# Patient Record
Sex: FEMALE | Race: WHITE | Employment: FULL TIME | URBAN - METROPOLITAN AREA
[De-identification: names, ages, dates, MRNs, and addresses within clinical notes are randomized per-mention and may not be internally consistent; named-entity substitution may affect disease eponyms.]

---

## 2017-01-06 ENCOUNTER — APPOINTMENT (OUTPATIENT)
Dept: LAB | Age: 32
End: 2017-01-06
Attending: OBSTETRICS & GYNECOLOGY
Payer: COMMERCIAL

## 2017-01-06 DIAGNOSIS — Z34.93 ENCOUNTER FOR SUPERVISION OF NORMAL PREGNANCY IN THIRD TRIMESTER, UNSPECIFIED GRAVIDITY: ICD-10-CM

## 2017-01-06 LAB
ERYTHROCYTE [DISTWIDTH] IN BLOOD BY AUTOMATED COUNT: 13.1 % (ref 11–15)
HCT VFR BLD AUTO: 39.5 % (ref 35–48)
HGB BLD-MCNC: 13.5 G/DL (ref 12–16)
MCH RBC QN AUTO: 32.5 PG (ref 27–32)
MCHC RBC AUTO-ENTMCNC: 34.2 G/DL (ref 32–37)
MCV RBC AUTO: 95.3 FL (ref 80–100)
PLATELET # BLD AUTO: 208 K/UL (ref 140–400)
PMV BLD AUTO: 10.4 FL (ref 7.4–10.3)
RBC # BLD AUTO: 4.14 M/UL (ref 3.7–5.4)
WBC # BLD AUTO: 10.3 K/UL (ref 4–11)

## 2017-01-06 PROCEDURE — 86780 TREPONEMA PALLIDUM: CPT

## 2017-01-06 PROCEDURE — 36415 COLL VENOUS BLD VENIPUNCTURE: CPT

## 2017-01-06 PROCEDURE — 85027 COMPLETE CBC AUTOMATED: CPT

## 2017-01-09 ENCOUNTER — ROUTINE PRENATAL (OUTPATIENT)
Dept: OBGYN CLINIC | Facility: CLINIC | Age: 32
End: 2017-01-09

## 2017-01-09 VITALS
DIASTOLIC BLOOD PRESSURE: 82 MMHG | HEART RATE: 99 BPM | WEIGHT: 172.63 LBS | SYSTOLIC BLOOD PRESSURE: 128 MMHG | BODY MASS INDEX: 29 KG/M2

## 2017-01-09 DIAGNOSIS — Z34.83 ENCOUNTER FOR SUPERVISION OF OTHER NORMAL PREGNANCY IN THIRD TRIMESTER: Primary | ICD-10-CM

## 2017-01-09 LAB
APPEARANCE: CLEAR
MULTISTIX LOT#: ABNORMAL NUMERIC
PH, URINE: 7 (ref 4.5–8)
SPECIFIC GRAVITY: 1 (ref 1–1.03)
T PALLIDUM AB SER QL: NEGATIVE
URINE-COLOR: YELLOW
UROBILINOGEN,SEMI-QN: 0 MG/DL (ref 0–1.9)

## 2017-01-16 ENCOUNTER — ROUTINE PRENATAL (OUTPATIENT)
Dept: OBGYN CLINIC | Facility: CLINIC | Age: 32
End: 2017-01-16

## 2017-01-16 VITALS
HEART RATE: 91 BPM | WEIGHT: 176 LBS | DIASTOLIC BLOOD PRESSURE: 87 MMHG | SYSTOLIC BLOOD PRESSURE: 136 MMHG | BODY MASS INDEX: 29 KG/M2

## 2017-01-16 DIAGNOSIS — Z34.93 ENCOUNTER FOR SUPERVISION OF NORMAL PREGNANCY IN THIRD TRIMESTER, UNSPECIFIED GRAVIDITY: Primary | ICD-10-CM

## 2017-01-16 LAB
KETONES (URINE DIPSTICK): 5 MG/DL
MULTISTIX LOT#: NORMAL NUMERIC
PH, URINE: 6.5 (ref 4.5–8)
SPECIFIC GRAVITY: 1.01 (ref 1–1.03)

## 2017-01-23 ENCOUNTER — HOSPITAL ENCOUNTER (OUTPATIENT)
Facility: HOSPITAL | Age: 32
Setting detail: OBSERVATION
Discharge: HOME | End: 2017-01-23
Attending: OBSTETRICS & GYNECOLOGY | Admitting: OBSTETRICS & GYNECOLOGY
Payer: COMMERCIAL

## 2017-01-23 ENCOUNTER — ROUTINE PRENATAL (OUTPATIENT)
Dept: OBGYN CLINIC | Facility: CLINIC | Age: 32
End: 2017-01-23

## 2017-01-23 VITALS
DIASTOLIC BLOOD PRESSURE: 90 MMHG | HEART RATE: 89 BPM | WEIGHT: 176.38 LBS | SYSTOLIC BLOOD PRESSURE: 132 MMHG | BODY MASS INDEX: 29 KG/M2

## 2017-01-23 VITALS — SYSTOLIC BLOOD PRESSURE: 116 MMHG | DIASTOLIC BLOOD PRESSURE: 76 MMHG | HEART RATE: 86 BPM

## 2017-01-23 DIAGNOSIS — Z34.93 ENCOUNTER FOR SUPERVISION OF NORMAL PREGNANCY IN THIRD TRIMESTER, UNSPECIFIED GRAVIDITY: Primary | ICD-10-CM

## 2017-01-23 PROBLEM — O16.3 HIGH BLOOD PRESSURE AFFECTING PREGNANCY IN THIRD TRIMESTER, ANTEPARTUM (HCC): Status: ACTIVE | Noted: 2017-01-23

## 2017-01-23 PROBLEM — O16.3 HIGH BLOOD PRESSURE AFFECTING PREGNANCY IN THIRD TRIMESTER, ANTEPARTUM: Status: ACTIVE | Noted: 2017-01-23

## 2017-01-23 LAB
ALT SERPL-CCNC: 16 U/L (ref 14–54)
AST SERPL-CCNC: 17 U/L (ref 15–41)
BASOPHILS # BLD: 0 K/UL (ref 0–0.2)
BASOPHILS NFR BLD: 0 %
CREAT UR-MCNC: 49.6 MG/DL
EOSINOPHIL # BLD: 0.1 K/UL (ref 0–0.7)
EOSINOPHIL NFR BLD: 1 %
ERYTHROCYTE [DISTWIDTH] IN BLOOD BY AUTOMATED COUNT: 12.9 % (ref 11–15)
HCT VFR BLD AUTO: 39.3 % (ref 35–48)
HGB BLD-MCNC: 13.3 G/DL (ref 12–16)
LYMPHOCYTES # BLD: 1.2 K/UL (ref 1–4)
LYMPHOCYTES NFR BLD: 13 %
MCH RBC QN AUTO: 32.1 PG (ref 27–32)
MCHC RBC AUTO-ENTMCNC: 33.9 G/DL (ref 32–37)
MCV RBC AUTO: 94.7 FL (ref 80–100)
MICROALBUMIN UR-MCNC: 0 MG/DL (ref 0–1.8)
MICROALBUMIN/CREAT UR: 0 MG/G{CREAT} (ref 0–20)
MONOCYTES # BLD: 0.7 K/UL (ref 0–1)
MONOCYTES NFR BLD: 7 %
MULTISTIX LOT#: NORMAL NUMERIC
NEUTROPHILS # BLD AUTO: 7.4 K/UL (ref 1.8–7.7)
NEUTROPHILS NFR BLD: 79 %
PH, URINE: 6.5 (ref 4.5–8)
PLATELET # BLD AUTO: 199 K/UL (ref 140–400)
PMV BLD AUTO: 10.1 FL (ref 7.4–10.3)
RBC # BLD AUTO: 4.15 M/UL (ref 3.7–5.4)
SPECIFIC GRAVITY: 1.01 (ref 1–1.03)
URATE SERPL-MCNC: 4.5 MG/DL (ref 2.1–7.4)
UROBILINOGEN,SEMI-QN: 0.2 MG/DL (ref 0–1.9)
WBC # BLD AUTO: 9.4 K/UL (ref 4–11)

## 2017-01-23 PROCEDURE — 59025 FETAL NON-STRESS TEST: CPT | Performed by: OBSTETRICS & GYNECOLOGY

## 2017-01-23 PROCEDURE — 99212 OFFICE O/P EST SF 10 MIN: CPT | Performed by: OBSTETRICS & GYNECOLOGY

## 2017-01-23 NOTE — PROGRESS NOTES
To [facility] for monitoring and PIH labs. Will stop work. RTC 1 wk.

## 2017-01-24 ENCOUNTER — TELEPHONE (OUTPATIENT)
Dept: OBGYN CLINIC | Facility: CLINIC | Age: 32
End: 2017-01-24

## 2017-01-24 NOTE — TRIAGE
[facility]      Triage Note    Karla Haro Patient Status:  Observation    1985 MRN [id number]   Location [address] C-D Attending Arturo Khan MD   Hosp Day # 0 PCP No primary care provider on file.        [place] [address]

## 2017-01-24 NOTE — TRIAGE
[facility]      Triage Note    Georgia Bolus Patient Status:  Observation    1985 MRN [id number]   Location [address] C-D Attending Maria Guadalupe Maguire MD   Hosp Day # 0 PCP No primary care provider on file.        Alfredo Reyez counts, handout given. Patient verbalized understanding. Reason for visit: Elevated blood pressure in office. Sent to [facility] triage for serial b/p and lab work.       Elvia Garcia RN  1/23/2017 8:00 PM        Addendum:      Diagnosis: Elevated BP in the

## 2017-01-24 NOTE — TELEPHONE ENCOUNTER
38 6/7 WEEKS PREGNANT WAS TOLD BY DR Zachery Pollack AT YESTERDAYS APPT CANT NOT  LONGER WORK, [facility]

## 2017-01-24 NOTE — TELEPHONE ENCOUNTER
Pt saw Jeison Rochao 1/23/17. ANTONK stated pt to [facility] for monitoring and [address]. Will stop work, RTC 1 wk. Type letter for pt to stop working as of 1/23/17 and sent by [address] LM for the pt that letter was sent by [address]

## 2017-01-26 ENCOUNTER — NURSE ONLY (OUTPATIENT)
Dept: OBGYN CLINIC | Facility: CLINIC | Age: 32
End: 2017-01-26

## 2017-01-26 VITALS
WEIGHT: 174.63 LBS | DIASTOLIC BLOOD PRESSURE: 81 MMHG | BODY MASS INDEX: 29 KG/M2 | HEART RATE: 91 BPM | SYSTOLIC BLOOD PRESSURE: 128 MMHG

## 2017-01-26 DIAGNOSIS — O13.3: Primary | ICD-10-CM

## 2017-01-26 LAB
APPEARANCE: CLEAR
MULTISTIX LOT#: NORMAL NUMERIC
PH, URINE: 6 (ref 4.5–8)
SPECIFIC GRAVITY: 1.01 (ref 1–1.03)
URINE-COLOR: YELLOW

## 2017-01-26 PROCEDURE — 81002 URINALYSIS NONAUTO W/O SCOPE: CPT | Performed by: OBSTETRICS & GYNECOLOGY

## 2017-01-26 PROCEDURE — 99211 OFF/OP EST MAY X REQ PHY/QHP: CPT | Performed by: OBSTETRICS & GYNECOLOGY

## 2017-01-26 NOTE — PROGRESS NOTES
PT HERE FOR BP CHECK. BLOOD PRESSURES RECENTLY BORDERLINE AND WAS PUT ON BEDREST ON 1-23-17. PT DENIES ANY SWELLING IN EXTREMITIES OR FACE. URINE DIP NEGATIVE. VISIT AND RESULTS REVIEWED WITH ANTONK (ON CALL) OVER THE PHONE.   SINCE PT HAS AN APPT ON [place]

## 2017-01-30 ENCOUNTER — ROUTINE PRENATAL (OUTPATIENT)
Dept: OBGYN CLINIC | Facility: CLINIC | Age: 32
End: 2017-01-30

## 2017-01-30 VITALS
WEIGHT: 175 LBS | HEART RATE: 81 BPM | SYSTOLIC BLOOD PRESSURE: 123 MMHG | DIASTOLIC BLOOD PRESSURE: 81 MMHG | BODY MASS INDEX: 29 KG/M2

## 2017-01-30 DIAGNOSIS — Z34.93 ENCOUNTER FOR SUPERVISION OF NORMAL PREGNANCY IN THIRD TRIMESTER, UNSPECIFIED GRAVIDITY: Primary | ICD-10-CM

## 2017-01-30 LAB
MULTISTIX LOT#: NORMAL NUMERIC
PH, URINE: 6.5 (ref 4.5–8)
SPECIFIC GRAVITY: 1.01 (ref 1–1.03)
UROBILINOGEN,SEMI-QN: 0.2 MG/DL (ref 0–1.9)

## 2017-02-06 ENCOUNTER — ROUTINE PRENATAL (OUTPATIENT)
Dept: OBGYN CLINIC | Facility: CLINIC | Age: 32
End: 2017-02-06

## 2017-02-06 ENCOUNTER — HOSPITAL ENCOUNTER (OUTPATIENT)
Facility: HOSPITAL | Age: 32
Setting detail: OBSERVATION
Discharge: HOME | End: 2017-02-06
Attending: OBSTETRICS & GYNECOLOGY | Admitting: OBSTETRICS & GYNECOLOGY
Payer: COMMERCIAL

## 2017-02-06 VITALS
SYSTOLIC BLOOD PRESSURE: 135 MMHG | DIASTOLIC BLOOD PRESSURE: 88 MMHG | HEART RATE: 97 BPM | WEIGHT: 179 LBS | BODY MASS INDEX: 30 KG/M2

## 2017-02-06 VITALS — DIASTOLIC BLOOD PRESSURE: 79 MMHG | SYSTOLIC BLOOD PRESSURE: 127 MMHG | HEART RATE: 93 BPM

## 2017-02-06 DIAGNOSIS — Z34.93 ENCOUNTER FOR SUPERVISION OF NORMAL PREGNANCY IN THIRD TRIMESTER, UNSPECIFIED GRAVIDITY: Primary | ICD-10-CM

## 2017-02-06 LAB
ALT SERPL-CCNC: 17 U/L (ref 14–54)
AST SERPL-CCNC: 17 U/L (ref 15–41)
BASOPHILS # BLD: 0 K/UL (ref 0–0.2)
BASOPHILS NFR BLD: 0 %
EOSINOPHIL # BLD: 0.1 K/UL (ref 0–0.7)
EOSINOPHIL NFR BLD: 1 %
ERYTHROCYTE [DISTWIDTH] IN BLOOD BY AUTOMATED COUNT: 13.2 % (ref 11–15)
HCT VFR BLD AUTO: 40.6 % (ref 35–48)
HGB BLD-MCNC: 13.7 G/DL (ref 12–16)
LYMPHOCYTES # BLD: 1.5 K/UL (ref 1–4)
LYMPHOCYTES NFR BLD: 13 %
MCH RBC QN AUTO: 32.1 PG (ref 27–32)
MCHC RBC AUTO-ENTMCNC: 33.8 G/DL (ref 32–37)
MCV RBC AUTO: 94.9 FL (ref 80–100)
MONOCYTES # BLD: 0.6 K/UL (ref 0–1)
MONOCYTES NFR BLD: 5 %
MULTISTIX LOT#: NORMAL NUMERIC
NEUTROPHILS # BLD AUTO: 9.8 K/UL (ref 1.8–7.7)
NEUTROPHILS NFR BLD: 82 %
PH, URINE: 7.5 (ref 4.5–8)
PLATELET # BLD AUTO: 195 K/UL (ref 140–400)
PMV BLD AUTO: 10.3 FL (ref 7.4–10.3)
RBC # BLD AUTO: 4.28 M/UL (ref 3.7–5.4)
SPECIFIC GRAVITY: 1.01 (ref 1–1.03)
UROBILINOGEN,SEMI-QN: 0.2 MG/DL (ref 0–1.9)
WBC # BLD AUTO: 12 K/UL (ref 4–11)

## 2017-02-06 PROCEDURE — 59025 FETAL NON-STRESS TEST: CPT | Performed by: OBSTETRICS & GYNECOLOGY

## 2017-02-06 PROCEDURE — 59425 ANTEPARTUM CARE ONLY: CPT | Performed by: OBSTETRICS & GYNECOLOGY

## 2017-02-06 PROCEDURE — 76815 OB US LIMITED FETUS(S): CPT | Performed by: OBSTETRICS & GYNECOLOGY

## 2017-02-07 ENCOUNTER — ANESTHESIA (OUTPATIENT)
Dept: OBGYN UNIT | Facility: HOSPITAL | Age: 32
End: 2017-02-07

## 2017-02-07 ENCOUNTER — ANESTHESIA EVENT (OUTPATIENT)
Dept: OBGYN UNIT | Facility: HOSPITAL | Age: 32
End: 2017-02-07

## 2017-02-07 ENCOUNTER — HOSPITAL ENCOUNTER (INPATIENT)
Facility: HOSPITAL | Age: 32
LOS: 1 days | Discharge: HOME | End: 2017-02-08
Attending: OBSTETRICS & GYNECOLOGY | Admitting: OBSTETRICS & GYNECOLOGY
Payer: COMMERCIAL

## 2017-02-07 ENCOUNTER — TELEPHONE (OUTPATIENT)
Dept: OBGYN CLINIC | Facility: CLINIC | Age: 32
End: 2017-02-07

## 2017-02-07 PROBLEM — O48.0 POST-DATES PREGNANCY: Status: ACTIVE | Noted: 2017-02-07

## 2017-02-07 PROBLEM — O48.0 POST-DATES PREGNANCY (HCC): Status: ACTIVE | Noted: 2017-02-07

## 2017-02-07 PROBLEM — O42.90 AMNIOTIC FLUID LEAKING: Status: ACTIVE | Noted: 2017-02-07

## 2017-02-07 PROBLEM — O42.90 AMNIOTIC FLUID LEAKING (HCC): Status: ACTIVE | Noted: 2017-02-07

## 2017-02-07 LAB
ERYTHROCYTE [DISTWIDTH] IN BLOOD BY AUTOMATED COUNT: 12.9 % (ref 11–15)
HCT VFR BLD AUTO: 39.6 % (ref 35–48)
HGB BLD-MCNC: 13.5 G/DL (ref 12–16)
MCH RBC QN AUTO: 32.3 PG (ref 27–32)
MCHC RBC AUTO-ENTMCNC: 34.1 G/DL (ref 32–37)
MCV RBC AUTO: 94.7 FL (ref 80–100)
PLATELET # BLD AUTO: 189 K/UL (ref 140–400)
PMV BLD AUTO: 10.5 FL (ref 7.4–10.3)
RBC # BLD AUTO: 4.18 M/UL (ref 3.7–5.4)
RH BLOOD TYPE: POSITIVE
WBC # BLD AUTO: 10.6 K/UL (ref 4–11)

## 2017-02-07 PROCEDURE — 0HBAXZX EXCISION OF INGUINAL SKIN, EXTERNAL APPROACH, DIAGNOSTIC: ICD-10-PCS | Performed by: OBSTETRICS & GYNECOLOGY

## 2017-02-07 PROCEDURE — 59410 OBSTETRICAL CARE: CPT | Performed by: OBSTETRICS & GYNECOLOGY

## 2017-02-07 PROCEDURE — 57135 EXCISION VAGINAL CYST/TUMOR: CPT | Performed by: OBSTETRICS & GYNECOLOGY

## 2017-02-07 PROCEDURE — 0KQM0ZZ REPAIR PERINEUM MUSCLE, OPEN APPROACH: ICD-10-PCS | Performed by: OBSTETRICS & GYNECOLOGY

## 2017-02-07 RX ORDER — SODIUM CHLORIDE 0.9 % (FLUSH) 0.9 %
10 SYRINGE (ML) INJECTION AS NEEDED
Status: DISCONTINUED | OUTPATIENT
Start: 2017-02-07 | End: 2017-02-07 | Stop reason: HOSPADM

## 2017-02-07 RX ORDER — AMMONIA INHALANTS 0.04 G/.3ML
0.3 INHALANT RESPIRATORY (INHALATION) AS NEEDED
Status: DISCONTINUED | OUTPATIENT
Start: 2017-02-07 | End: 2017-02-07 | Stop reason: HOSPADM

## 2017-02-07 RX ORDER — ZOLPIDEM TARTRATE 5 MG/1
5 TABLET ORAL NIGHTLY PRN
Status: DISCONTINUED | OUTPATIENT
Start: 2017-02-07 | End: 2017-02-08

## 2017-02-07 RX ORDER — NALBUPHINE HCL 10 MG/ML
2.5 AMPUL (ML) INJECTION
Status: DISCONTINUED | OUTPATIENT
Start: 2017-02-07 | End: 2017-02-08

## 2017-02-07 RX ORDER — ONDANSETRON 2 MG/ML
4 INJECTION INTRAMUSCULAR; INTRAVENOUS EVERY 6 HOURS PRN
Status: DISCONTINUED | OUTPATIENT
Start: 2017-02-07 | End: 2017-02-08

## 2017-02-07 RX ORDER — LIDOCAINE HYDROCHLORIDE AND EPINEPHRINE 15; 5 MG/ML; UG/ML
INJECTION, SOLUTION EPIDURAL AS NEEDED
OUTPATIENT
Start: 2017-02-07

## 2017-02-07 RX ORDER — SODIUM CHLORIDE, SODIUM LACTATE, POTASSIUM CHLORIDE, CALCIUM CHLORIDE 600; 310; 30; 20 MG/100ML; MG/100ML; MG/100ML; MG/100ML
INJECTION, SOLUTION INTRAVENOUS CONTINUOUS
Status: DISCONTINUED | OUTPATIENT
Start: 2017-02-07 | End: 2017-02-07 | Stop reason: HOSPADM

## 2017-02-07 RX ORDER — IBUPROFEN 600 MG/1
600 TABLET ORAL EVERY 6 HOURS
Status: DISCONTINUED | OUTPATIENT
Start: 2017-02-07 | End: 2017-02-08

## 2017-02-07 RX ORDER — BUPIVACAINE HYDROCHLORIDE 2.5 MG/ML
INJECTION, SOLUTION EPIDURAL; INFILTRATION; INTRACAUDAL
Status: COMPLETED
Start: 2017-02-07 | End: 2017-02-07

## 2017-02-07 RX ORDER — BISACODYL 10 MG
10 SUPPOSITORY, RECTAL RECTAL ONCE AS NEEDED
Status: ACTIVE | OUTPATIENT
Start: 2017-02-07 | End: 2017-02-07

## 2017-02-07 RX ORDER — AMMONIA INHALANTS 0.04 G/.3ML
0.3 INHALANT RESPIRATORY (INHALATION) AS NEEDED
Status: DISCONTINUED | OUTPATIENT
Start: 2017-02-07 | End: 2017-02-08

## 2017-02-07 RX ORDER — IBUPROFEN 600 MG/1
600 TABLET ORAL ONCE AS NEEDED
Status: DISCONTINUED | OUTPATIENT
Start: 2017-02-07 | End: 2017-02-07 | Stop reason: HOSPADM

## 2017-02-07 RX ORDER — LIDOCAINE HYDROCHLORIDE AND EPINEPHRINE 20; 5 MG/ML; UG/ML
INJECTION, SOLUTION EPIDURAL; INFILTRATION; INTRACAUDAL; PERINEURAL
Status: DISCONTINUED
Start: 2017-02-07 | End: 2017-02-07 | Stop reason: WASHOUT

## 2017-02-07 RX ORDER — HYDROCODONE BITARTRATE AND ACETAMINOPHEN 5; 325 MG/1; MG/1
1 TABLET ORAL EVERY 4 HOURS PRN
Status: DISCONTINUED | OUTPATIENT
Start: 2017-02-07 | End: 2017-02-08

## 2017-02-07 RX ORDER — DIAPER,BRIEF,INFANT-TODD,DISP
EACH MISCELLANEOUS EVERY 6 HOURS PRN
Status: DISCONTINUED | OUTPATIENT
Start: 2017-02-07 | End: 2017-02-08

## 2017-02-07 RX ORDER — 0.9 % SODIUM CHLORIDE 0.9 %
10 VIAL (ML) INJECTION AS NEEDED
Status: DISCONTINUED | OUTPATIENT
Start: 2017-02-07 | End: 2017-02-08

## 2017-02-07 RX ORDER — SODIUM CHLORIDE, SODIUM LACTATE, POTASSIUM CHLORIDE, CALCIUM CHLORIDE 600; 310; 30; 20 MG/100ML; MG/100ML; MG/100ML; MG/100ML
INJECTION, SOLUTION INTRAVENOUS
Status: COMPLETED
Start: 2017-02-07 | End: 2017-02-07

## 2017-02-07 RX ORDER — EPHEDRINE SULFATE 50 MG/ML
5 INJECTION, SOLUTION INTRAVENOUS AS NEEDED
Status: DISCONTINUED | OUTPATIENT
Start: 2017-02-07 | End: 2017-02-07

## 2017-02-07 RX ORDER — DOCUSATE SODIUM 100 MG/1
100 CAPSULE, LIQUID FILLED ORAL 2 TIMES DAILY
Status: DISCONTINUED | OUTPATIENT
Start: 2017-02-07 | End: 2017-02-08

## 2017-02-07 RX ORDER — LIDOCAINE HYDROCHLORIDE 10 MG/ML
INJECTION, SOLUTION EPIDURAL; INFILTRATION; INTRACAUDAL; PERINEURAL AS NEEDED
OUTPATIENT
Start: 2017-02-07

## 2017-02-07 RX ORDER — SODIUM CHLORIDE, SODIUM LACTATE, POTASSIUM CHLORIDE, CALCIUM CHLORIDE 600; 310; 30; 20 MG/100ML; MG/100ML; MG/100ML; MG/100ML
1000 INJECTION, SOLUTION INTRAVENOUS ONCE
Status: DISCONTINUED | OUTPATIENT
Start: 2017-02-07 | End: 2017-02-07

## 2017-02-07 RX ORDER — TERBUTALINE SULFATE 1 MG/ML
0.25 INJECTION, SOLUTION SUBCUTANEOUS AS NEEDED
Status: DISCONTINUED | OUTPATIENT
Start: 2017-02-07 | End: 2017-02-07 | Stop reason: HOSPADM

## 2017-02-07 RX ORDER — LIDOCAINE HYDROCHLORIDE 10 MG/ML
30 INJECTION, SOLUTION EPIDURAL; INFILTRATION; INTRACAUDAL; PERINEURAL ONCE
Status: DISCONTINUED | OUTPATIENT
Start: 2017-02-07 | End: 2017-02-07 | Stop reason: HOSPADM

## 2017-02-07 RX ORDER — PRENATAL VIT,CAL 76/IRON/FOLIC 29 MG-1 MG
1 TABLET ORAL DAILY
Status: DISCONTINUED | OUTPATIENT
Start: 2017-02-07 | End: 2017-02-08

## 2017-02-07 RX ORDER — HYDROCODONE BITARTRATE AND ACETAMINOPHEN 5; 325 MG/1; MG/1
2 TABLET ORAL EVERY 4 HOURS PRN
Status: DISCONTINUED | OUTPATIENT
Start: 2017-02-07 | End: 2017-02-08

## 2017-02-07 RX ORDER — ACETAMINOPHEN 325 MG/1
650 TABLET ORAL EVERY 4 HOURS PRN
Status: DISCONTINUED | OUTPATIENT
Start: 2017-02-07 | End: 2017-02-08

## 2017-02-07 RX ORDER — DEXTROSE, SODIUM CHLORIDE, SODIUM LACTATE, POTASSIUM CHLORIDE, AND CALCIUM CHLORIDE 5; .6; .31; .03; .02 G/100ML; G/100ML; G/100ML; G/100ML; G/100ML
125 INJECTION, SOLUTION INTRAVENOUS CONTINUOUS
Status: DISCONTINUED | OUTPATIENT
Start: 2017-02-07 | End: 2017-02-07 | Stop reason: HOSPADM

## 2017-02-07 RX ORDER — SIMETHICONE 80 MG
80 TABLET,CHEWABLE ORAL 3 TIMES DAILY PRN
Status: DISCONTINUED | OUTPATIENT
Start: 2017-02-07 | End: 2017-02-08

## 2017-02-07 RX ADMIN — BUPIVACAINE HYDROCHLORIDE 0.75 MG: 2.5 INJECTION, SOLUTION EPIDURAL; INFILTRATION; INTRACAUDAL at 05:44:00

## 2017-02-07 RX ADMIN — LIDOCAINE HYDROCHLORIDE AND EPINEPHRINE 3 ML: 15; 5 INJECTION, SOLUTION EPIDURAL at 05:46:00

## 2017-02-07 RX ADMIN — LIDOCAINE HYDROCHLORIDE 3 ML: 10 INJECTION, SOLUTION EPIDURAL; INFILTRATION; INTRACAUDAL; PERINEURAL at 05:44:00

## 2017-02-07 NOTE — H&P
Arabella Tér 35. Patient Status:  Inpatient    1985 MRN [id number]   Location [address] C-D Attending Stevenson Carter MD   Hosp Day # 0 PCP No primary care provider on file.      Date Meds:   Prescriptions prior to admission:  PRENATAL MULTI +DHA 27-0.8-228 MG Oral Cap Take 1 capsule by mouth daily.  Disp:  Rfl:  2/6/2017 at Unknown time     Allergies: No Known Allergies    OBJECTIVE:    Temp:  [97.9 °F (36.6 °C)] 97.9 °F (36.6 °C)  Puls

## 2017-02-07 NOTE — TELEPHONE ENCOUNTER
Paged while on call. Patient felt a pop and gush of fluid, and continues leaking clear fluid. No contractions. Recommended patient go to [facility] for further evaluation.

## 2017-02-07 NOTE — DISCHARGE SUMMARY
[facility]    Discharge Summary    Eli Padron Patient Status:  Inpatient    1985 MRN [id number]   Location [facility] Attending Judson Nelson MD   [facility] Day # 0       Delivering OB Clinician: Dr. Sima Padilla

## 2017-02-07 NOTE — L&D DELIVERY NOTE
[facility]OS[facility] - [facility]    Vaginal Delivery Note    Marie Boothois Patient Status:  Inpatient    1985 MRN [id number]   Location [address] C-D Attending Eldon Lakhani MD   Hosp Day # 0 PCP No primary care provider on file.      D

## 2017-02-07 NOTE — ANESTHESIA PREPROCEDURE EVALUATION
Anesthesia PreOp Note    HPI:     Otoniel Blount is a 27year old female who presents for preoperative consultation requested by: * No surgeons listed *    Date of Surgery: 2/7/2017    * No procedures listed *  Indication: term labor for vaginal delive 100 mcg Intravenous Once Ksenia Segal MD    lidocaine-EPINEPHrine 2 %-1:808636 injection        Lidocaine HCl (PF) (XYLOCAINE) 1 % injection SOLN   PRN Stefani Samayoa MD 3 mL at 02/07/17 0544   lidocaine-EPINEPHrine 1.5 %-1:611595 injection   PRN Alin Gandara temperature is 97.9 °F (36.6 °C).  Her blood pressure is 112/74 and her pulse is 96.    02/07/17  0415 02/07/17  0430 02/07/17  0456 02/07/17  0500   BP: 131/92 121/84 112/74    Pulse: 97 89 96    Temp:   97.9 °F (36.6 °C)    Height:    1.676 m (5' 6\")   W

## 2017-02-07 NOTE — ANESTHESIA PROCEDURE NOTES
Labor Analgesia  Performed by: MILKA PINO  Authorized by: MILKA PINO    Patient Location:  OB  Start Time:  2/7/2017 5:39 AM  End Time:  2/7/2017 5:51 AM  Reason for Block: labor epidural    Anesthesiologist:  MILKA PINO  Performed by:   Clark

## 2017-02-07 NOTE — LACTATION NOTE
LACTATION NOTE - MOTHER           Problems identified  Problems identified: Knowledge deficit         Breastfeeding goal  Breastfeeding goal: To maintain breast milk feeding per patient goal    Maternal Assessment  Bilateral Breasts: Soft  Prior breastfeed

## 2017-02-07 NOTE — PROGRESS NOTES
Patient up to bathroom with assist x 1. Voided lrg amt. Patient transferred to mother/baby room 368 per wheelchair in stable condition with baby and personal belongings. Accompanied by significant other and staff.   Report given to mother/baby MEDINA Kilgore

## 2017-02-07 NOTE — TRIAGE
[facility] - [facility]      Triage Note    Namita Manzanares Patient Status:  Observation    1985 MRN [id number]   Location [address] C-D Attending Dc Storm MD   Hosp Day # 0 PCP No primary care provider on file.        Keon Galarza home.  Pt to follow up in two days in office. Pt educated and given handouts on fetal kick counts, s/s preeclampsia and hydration. Pt verbalized understanding.         Indio Huber RN  2/6/2017 6:19 PM      Addendum:    Diagnosis: Post dates pregnan

## 2017-02-08 VITALS
DIASTOLIC BLOOD PRESSURE: 73 MMHG | BODY MASS INDEX: 28.77 KG/M2 | RESPIRATION RATE: 16 BRPM | WEIGHT: 179 LBS | SYSTOLIC BLOOD PRESSURE: 112 MMHG | HEIGHT: 66 IN | HEART RATE: 82 BPM | TEMPERATURE: 98 F

## 2017-02-08 LAB
BASOPHILS # BLD: 0 K/UL (ref 0–0.2)
BASOPHILS NFR BLD: 0 %
EOSINOPHIL # BLD: 0.2 K/UL (ref 0–0.7)
EOSINOPHIL NFR BLD: 2 %
ERYTHROCYTE [DISTWIDTH] IN BLOOD BY AUTOMATED COUNT: 13.1 % (ref 11–15)
HCT VFR BLD AUTO: 37.4 % (ref 35–48)
HGB BLD-MCNC: 12.7 G/DL (ref 12–16)
LYMPHOCYTES # BLD: 2.3 K/UL (ref 1–4)
LYMPHOCYTES NFR BLD: 22 %
MCH RBC QN AUTO: 32.5 PG (ref 27–32)
MCHC RBC AUTO-ENTMCNC: 33.9 G/DL (ref 32–37)
MCV RBC AUTO: 95.8 FL (ref 80–100)
MONOCYTES # BLD: 0.7 K/UL (ref 0–1)
MONOCYTES NFR BLD: 7 %
NEUTROPHILS # BLD AUTO: 7.2 K/UL (ref 1.8–7.7)
NEUTROPHILS NFR BLD: 69 %
PLATELET # BLD AUTO: 160 K/UL (ref 140–400)
PMV BLD AUTO: 10.2 FL (ref 7.4–10.3)
RBC # BLD AUTO: 3.91 M/UL (ref 3.7–5.4)
WBC # BLD AUTO: 10.5 K/UL (ref 4–11)

## 2017-02-08 NOTE — LACTATION NOTE
This note was copied from the chart of Janelle Montalvo. LACTATION NOTE - INFANT    Evaluation Type  Evaluation Type: Inpatient    Problems & Assessment  Infant Assessment: Skin color: pink or appropriate for ethnicity; Minimal hunger cues present  Muscle to

## 2017-02-08 NOTE — LACTATION NOTE
LACTATION NOTE - MOTHER           Problems identified  Problems identified: Knowledge deficit         Breastfeeding goal  Breastfeeding goal: To maintain breast milk feeding per patient goal    Maternal Assessment  Bilateral Breasts: Filling  Bilateral Nip

## 2017-02-08 NOTE — LACTATION NOTE
Assisted with positioning of infant for feeding. Mom placed infant on back and leaned over to offer breast. Advised to hold infant in close with chin lifted for latch.

## 2017-02-09 ENCOUNTER — TELEPHONE (OUTPATIENT)
Dept: OBGYN CLINIC | Facility: CLINIC | Age: 32
End: 2017-02-09

## 2017-02-09 NOTE — TELEPHONE ENCOUNTER
Informed pt that [facility] stated that cyst removed after deliver the other day was an inclusion cyst.  Pt stated understanding.

## 2017-02-09 NOTE — PLAN OF CARE
PAIN - ADULT    • Verbalizes/displays adequate comfort level or patient's stated pain goal Completed          POSTPARTUM    • Long Term Goal:Experiences normal postpartum course Completed    • Establishment of adequate milk supply with medication/procedure

## 2017-02-09 NOTE — TELEPHONE ENCOUNTER
----- Message from Doug Villafuerte MD sent at 2/8/2017  4:24 PM CST -----  Please let patient know that cyst removed after delivery the other day was an inclusion cyst.

## 2017-03-23 ENCOUNTER — POSTPARTUM (OUTPATIENT)
Dept: OBGYN CLINIC | Facility: CLINIC | Age: 32
End: 2017-03-23

## 2017-03-23 VITALS
DIASTOLIC BLOOD PRESSURE: 79 MMHG | SYSTOLIC BLOOD PRESSURE: 115 MMHG | HEART RATE: 85 BPM | BODY MASS INDEX: 25 KG/M2 | WEIGHT: 156 LBS

## 2017-03-23 NOTE — PROGRESS NOTES
Here for post-partum visit. Pt had a vaginal delivery on 2/7/17 with Dr. Sri Hernandez. Infant- girl doing well. There were no complications. Pt is breast feeding. Pt desire nothing at this time for contraception. LMP-none.   Currently patient is not sexuall

## 2020-02-07 ENCOUNTER — HOSPITAL ENCOUNTER (OUTPATIENT)
Age: 35
Discharge: HOME | End: 2020-02-07
Attending: EMERGENCY MEDICINE
Payer: COMMERCIAL

## 2020-02-07 VITALS
SYSTOLIC BLOOD PRESSURE: 137 MMHG | WEIGHT: 155 LBS | HEART RATE: 100 BPM | DIASTOLIC BLOOD PRESSURE: 80 MMHG | HEIGHT: 66 IN | RESPIRATION RATE: 18 BRPM | BODY MASS INDEX: 24.91 KG/M2 | TEMPERATURE: 99 F | OXYGEN SATURATION: 100 %

## 2020-02-07 DIAGNOSIS — R05.9 COUGH: Primary | ICD-10-CM

## 2020-02-07 PROCEDURE — 99213 OFFICE O/P EST LOW 20 MIN: CPT

## 2020-02-07 PROCEDURE — 99204 OFFICE O/P NEW MOD 45 MIN: CPT

## 2020-02-07 RX ORDER — AZITHROMYCIN 250 MG/1
TABLET, FILM COATED ORAL
Qty: 1 PACKAGE | Refills: 0 | Status: SHIPPED | OUTPATIENT
Start: 2020-02-07 | End: 2020-02-12

## 2020-02-07 RX ORDER — BENZONATATE 100 MG/1
100 CAPSULE ORAL 3 TIMES DAILY PRN
Qty: 21 CAPSULE | Refills: 0 | Status: SHIPPED | OUTPATIENT
Start: 2020-02-07 | End: 2020-04-09

## 2020-02-07 RX ORDER — CODEINE PHOSPHATE AND GUAIFENESIN 10; 100 MG/5ML; MG/5ML
5 SOLUTION ORAL EVERY 6 HOURS PRN
Qty: 100 ML | Refills: 0 | Status: SHIPPED | OUTPATIENT
Start: 2020-02-07 | End: 2020-04-09

## 2020-02-07 RX ORDER — ALBUTEROL SULFATE 90 UG/1
2 AEROSOL, METERED RESPIRATORY (INHALATION) EVERY 6 HOURS PRN
Qty: 1 INHALER | Refills: 0 | Status: SHIPPED | OUTPATIENT
Start: 2020-02-07 | End: 2020-03-08

## 2020-02-07 NOTE — ED PROVIDER NOTES
Patient Seen in: [address]      History   Patient presents with:  Cough/URI    Stated Complaint: cough    HPI    Patient complains of a cough for the last 6 days.   She has had fever and chills and noted sinus congestion nerve deficits patient moves all 4 extremities without impairment            MDM     Discussed with patient would not perform flu testing due to duration of illness. Discussed performing chest x-ray will defer on at present.   Patient given a wait-and-see

## 2020-03-16 ENCOUNTER — TELEPHONE (OUTPATIENT)
Dept: OBGYN CLINIC | Facility: CLINIC | Age: 35
End: 2020-03-16

## 2020-03-16 NOTE — TELEPHONE ENCOUNTER
Pt has an OBN scheduled on Thursday. Our manager stated that she would talk to the pt regarding her OBN appt on Thursday. She will be here for the appt, so a phone call will be necessary.

## 2020-03-19 ENCOUNTER — TELEPHONE (OUTPATIENT)
Dept: OBGYN CLINIC | Facility: CLINIC | Age: 35
End: 2020-03-19

## 2020-03-19 ENCOUNTER — NURSE ONLY (OUTPATIENT)
Dept: OBGYN CLINIC | Facility: CLINIC | Age: 35
End: 2020-03-19
Payer: COMMERCIAL

## 2020-03-19 VITALS — WEIGHT: 155 LBS | BODY MASS INDEX: 25 KG/M2

## 2020-03-19 DIAGNOSIS — Z34.91 ENCOUNTER FOR SUPERVISION OF NORMAL PREGNANCY IN FIRST TRIMESTER, UNSPECIFIED GRAVIDITY: Primary | ICD-10-CM

## 2020-03-19 NOTE — TELEPHONE ENCOUNTER
Pt had OBN appt today. Pt requesting TSH due to pt Mom and sister recently being dx with hypothyroidism. Informed pt meesage will be sent to [address] [place] [address] (on-call) for approval. If [facility] approves TSH, no need to inform pt. Pt verbalized understanding.      RENATA-okay to

## 2020-03-19 NOTE — PROGRESS NOTES
Pt had OBN PC appt today with no complaints. Normal PN labs, HCG (qual) and 1 hr gtt ordered. Pt advised all labs must be completed and resulted prior to MD appt.   Assisted pt with scheduling NPN appt with CORAL for 3/27/2020 at 840am.     Partner's name i Anencephaly): No   Congenital heart defect: No   Down syndrome: No   Chase-Sachs (Ashkenazi [place], [facility], [place]): No   Canavan disease (Ashkenazi Pentecostalism): No   Familial dysautonomia (Ashkenazi Pentecostalism): No   Sickle cell disease or trait ():

## 2020-04-01 ENCOUNTER — LAB ENCOUNTER (OUTPATIENT)
Dept: LAB | Facility: HOSPITAL | Age: 35
End: 2020-04-01
Attending: OBSTETRICS & GYNECOLOGY
Payer: COMMERCIAL

## 2020-04-01 DIAGNOSIS — Z34.91 ENCOUNTER FOR SUPERVISION OF NORMAL PREGNANCY IN FIRST TRIMESTER, UNSPECIFIED GRAVIDITY: ICD-10-CM

## 2020-04-01 PROCEDURE — 84443 ASSAY THYROID STIM HORMONE: CPT

## 2020-04-01 PROCEDURE — 86850 RBC ANTIBODY SCREEN: CPT

## 2020-04-01 PROCEDURE — 36415 COLL VENOUS BLD VENIPUNCTURE: CPT

## 2020-04-01 PROCEDURE — 86900 BLOOD TYPING SEROLOGIC ABO: CPT

## 2020-04-01 PROCEDURE — 87389 HIV-1 AG W/HIV-1&-2 AB AG IA: CPT

## 2020-04-01 PROCEDURE — 82950 GLUCOSE TEST: CPT

## 2020-04-01 PROCEDURE — 86780 TREPONEMA PALLIDUM: CPT

## 2020-04-01 PROCEDURE — 86762 RUBELLA ANTIBODY: CPT

## 2020-04-01 PROCEDURE — 84703 CHORIONIC GONADOTROPIN ASSAY: CPT

## 2020-04-01 PROCEDURE — 86901 BLOOD TYPING SEROLOGIC RH(D): CPT

## 2020-04-01 PROCEDURE — 85025 COMPLETE CBC W/AUTO DIFF WBC: CPT

## 2020-04-01 PROCEDURE — 87340 HEPATITIS B SURFACE AG IA: CPT

## 2020-04-01 PROCEDURE — 87086 URINE CULTURE/COLONY COUNT: CPT

## 2020-04-02 ENCOUNTER — TELEPHONE (OUTPATIENT)
Dept: OBGYN CLINIC | Facility: CLINIC | Age: 35
End: 2020-04-02

## 2020-04-02 DIAGNOSIS — R73.09 ELEVATED GLUCOSE TOLERANCE TEST: Primary | ICD-10-CM

## 2020-04-03 ENCOUNTER — TELEPHONE (OUTPATIENT)
Dept: OBGYN CLINIC | Facility: CLINIC | Age: 35
End: 2020-04-03

## 2020-04-03 ENCOUNTER — INITIAL PRENATAL (OUTPATIENT)
Dept: OBGYN CLINIC | Facility: CLINIC | Age: 35
End: 2020-04-03
Payer: COMMERCIAL

## 2020-04-03 VITALS
HEART RATE: 102 BPM | BODY MASS INDEX: 25 KG/M2 | SYSTOLIC BLOOD PRESSURE: 112 MMHG | WEIGHT: 155 LBS | DIASTOLIC BLOOD PRESSURE: 76 MMHG

## 2020-04-03 DIAGNOSIS — Z34.91 ENCOUNTER FOR SUPERVISION OF NORMAL PREGNANCY IN FIRST TRIMESTER, UNSPECIFIED GRAVIDITY: Primary | ICD-10-CM

## 2020-04-03 DIAGNOSIS — Z36.9 FIRST TRIMESTER SCREENING: Primary | ICD-10-CM

## 2020-04-03 DIAGNOSIS — O09.521 ADVANCED MATERNAL AGE IN MULTIGRAVIDA, FIRST TRIMESTER: ICD-10-CM

## 2020-04-03 PROBLEM — O42.90 AMNIOTIC FLUID LEAKING (HCC): Status: RESOLVED | Noted: 2017-02-07 | Resolved: 2020-04-03

## 2020-04-03 PROBLEM — O48.0 POST-DATES PREGNANCY (HCC): Status: RESOLVED | Noted: 2017-02-07 | Resolved: 2020-04-03

## 2020-04-03 PROBLEM — O16.3 HIGH BLOOD PRESSURE AFFECTING PREGNANCY IN THIRD TRIMESTER, ANTEPARTUM (HCC): Status: RESOLVED | Noted: 2017-01-23 | Resolved: 2020-04-03

## 2020-04-03 PROBLEM — O09.529 ANTEPARTUM MULTIGRAVIDA OF ADVANCED MATERNAL AGE (HCC): Status: ACTIVE | Noted: 2020-04-03

## 2020-04-03 PROBLEM — O16.3 HIGH BLOOD PRESSURE AFFECTING PREGNANCY IN THIRD TRIMESTER, ANTEPARTUM: Status: RESOLVED | Noted: 2017-01-23 | Resolved: 2020-04-03

## 2020-04-03 PROBLEM — O09.529 ANTEPARTUM MULTIGRAVIDA OF ADVANCED MATERNAL AGE: Status: ACTIVE | Noted: 2020-04-03

## 2020-04-03 PROBLEM — O42.90 AMNIOTIC FLUID LEAKING: Status: RESOLVED | Noted: 2017-02-07 | Resolved: 2020-04-03

## 2020-04-03 PROBLEM — O48.0 POST-DATES PREGNANCY: Status: RESOLVED | Noted: 2017-02-07 | Resolved: 2020-04-03

## 2020-04-03 PROCEDURE — 81002 URINALYSIS NONAUTO W/O SCOPE: CPT | Performed by: OBSTETRICS & GYNECOLOGY

## 2020-04-03 NOTE — TELEPHONE ENCOUNTER
Informed pt of results and RENATA recs below. Advised pt she needs to fast 12 hours prior. Phone number provided so pt can schedule an appt. Pt verbalized understanding.

## 2020-04-08 ENCOUNTER — LABORATORY ENCOUNTER (OUTPATIENT)
Dept: LAB | Facility: HOSPITAL | Age: 35
End: 2020-04-08
Attending: OBSTETRICS & GYNECOLOGY
Payer: COMMERCIAL

## 2020-04-08 DIAGNOSIS — R73.09 ELEVATED GLUCOSE TOLERANCE TEST: ICD-10-CM

## 2020-04-08 PROCEDURE — 82952 GTT-ADDED SAMPLES: CPT

## 2020-04-08 PROCEDURE — 82951 GLUCOSE TOLERANCE TEST (GTT): CPT

## 2020-04-08 PROCEDURE — 36415 COLL VENOUS BLD VENIPUNCTURE: CPT

## 2020-04-09 ENCOUNTER — HOSPITAL ENCOUNTER (OUTPATIENT)
Dept: PERINATAL CARE | Facility: HOSPITAL | Age: 35
Discharge: HOME | End: 2020-04-09
Attending: OBSTETRICS & GYNECOLOGY
Payer: COMMERCIAL

## 2020-04-09 ENCOUNTER — TELEPHONE (OUTPATIENT)
Dept: OBGYN CLINIC | Facility: CLINIC | Age: 35
End: 2020-04-09

## 2020-04-09 VITALS
DIASTOLIC BLOOD PRESSURE: 84 MMHG | BODY MASS INDEX: 24.91 KG/M2 | SYSTOLIC BLOOD PRESSURE: 120 MMHG | HEART RATE: 94 BPM | HEIGHT: 66 IN | WEIGHT: 155 LBS

## 2020-04-09 DIAGNOSIS — O35.8XX0 FETAL OMPHALOCELE DURING PREGNANCY, ANTEPARTUM, SINGLE OR UNSPECIFIED FETUS: ICD-10-CM

## 2020-04-09 DIAGNOSIS — O02.1 MISSED ABORTION: ICD-10-CM

## 2020-04-09 DIAGNOSIS — O09.521 ADVANCED MATERNAL AGE IN MULTIGRAVIDA, FIRST TRIMESTER: ICD-10-CM

## 2020-04-09 DIAGNOSIS — Z36.9 FIRST TRIMESTER SCREENING: ICD-10-CM

## 2020-04-09 PROCEDURE — 76801 OB US < 14 WKS SINGLE FETUS: CPT | Performed by: OBSTETRICS & GYNECOLOGY

## 2020-04-09 PROCEDURE — 99242 OFF/OP CONSLTJ NEW/EST SF 20: CPT | Performed by: OBSTETRICS & GYNECOLOGY

## 2020-04-09 NOTE — TELEPHONE ENCOUNTER
Lm advised procedure booked for 4/9/20 with  at 930am. And that she wouldnt take anything by mouth after midnight tonight,[facility] will be calling her today with instructions.

## 2020-04-09 NOTE — H&P
Arabella Brown 35. Patient Status:  Hospital Outpatient Surgery    1985 MRN [id number]   Location [place] [address] Attending Dusty Nixon MD   Hosp Day # 0 PCP Cole Lau.  DO Erik Family History:   Family History   Problem Relation Age of Onset   • Thyroid disease Father         hypothyroidism   • Thyroid disease Mother         hypothyroidism   • Prostate Cancer Maternal Grandfather    • Diabetes Maternal Uncle    • Hypertension

## 2020-04-09 NOTE — TELEPHONE ENCOUNTER
Spoke with Dr Sarah Owen. Saw pt today for FTS--  Pt is 12+ wks, but no FHT. Also ? gastroschesis. No bleeding or cramping. Will need suction D&C and genetics    Spoke with pt. Wants to proceed with suction D&C.   Discussed risks of surgery      OB GYN SURGICAL SCHEDULING    Assessment: Missed     Pre-Operative Procedure:  Suction D&C    Admission:  Day Surgery    Anesthesia: General    Additional Orders:  Routine Orders    Comments / Orders to Nurse: ?Friday or Monday    Discussed possible complications including but not limited to:  bleeding, infection and perforation of uterus

## 2020-04-10 ENCOUNTER — HOSPITAL ENCOUNTER (OUTPATIENT)
Facility: HOSPITAL | Age: 35
Setting detail: HOSPITAL OUTPATIENT SURGERY
Discharge: HOME | End: 2020-04-10
Attending: OBSTETRICS & GYNECOLOGY | Admitting: OBSTETRICS & GYNECOLOGY
Payer: COMMERCIAL

## 2020-04-10 ENCOUNTER — ANESTHESIA EVENT (OUTPATIENT)
Dept: SURGERY | Facility: HOSPITAL | Age: 35
End: 2020-04-10
Payer: COMMERCIAL

## 2020-04-10 ENCOUNTER — ANESTHESIA (OUTPATIENT)
Dept: SURGERY | Facility: HOSPITAL | Age: 35
End: 2020-04-10
Payer: COMMERCIAL

## 2020-04-10 ENCOUNTER — TELEPHONE (OUTPATIENT)
Dept: OBGYN CLINIC | Facility: CLINIC | Age: 35
End: 2020-04-10

## 2020-04-10 VITALS
OXYGEN SATURATION: 100 % | SYSTOLIC BLOOD PRESSURE: 109 MMHG | RESPIRATION RATE: 16 BRPM | TEMPERATURE: 98 F | HEART RATE: 58 BPM | BODY MASS INDEX: 24.91 KG/M2 | WEIGHT: 155 LBS | DIASTOLIC BLOOD PRESSURE: 67 MMHG | HEIGHT: 66 IN

## 2020-04-10 DIAGNOSIS — O02.1 MISSED ABORTION: Primary | ICD-10-CM

## 2020-04-10 DIAGNOSIS — O02.1 MISSED ABORTION: ICD-10-CM

## 2020-04-10 PROCEDURE — 59820 CARE OF MISCARRIAGE: CPT | Performed by: OBSTETRICS & GYNECOLOGY

## 2020-04-10 PROCEDURE — 10D17ZZ EXTRACTION OF PRODUCTS OF CONCEPTION, RETAINED, VIA NATURAL OR ARTIFICIAL OPENING: ICD-10-PCS | Performed by: OBSTETRICS & GYNECOLOGY

## 2020-04-10 RX ORDER — ONDANSETRON 2 MG/ML
4 INJECTION INTRAMUSCULAR; INTRAVENOUS ONCE AS NEEDED
Status: DISCONTINUED | OUTPATIENT
Start: 2020-04-10 | End: 2020-04-10

## 2020-04-10 RX ORDER — KETOROLAC TROMETHAMINE 30 MG/ML
INJECTION, SOLUTION INTRAMUSCULAR; INTRAVENOUS AS NEEDED
Status: DISCONTINUED | OUTPATIENT
Start: 2020-04-10 | End: 2020-04-10 | Stop reason: SURG

## 2020-04-10 RX ORDER — MORPHINE SULFATE 10 MG/ML
6 INJECTION, SOLUTION INTRAMUSCULAR; INTRAVENOUS EVERY 10 MIN PRN
Status: DISCONTINUED | OUTPATIENT
Start: 2020-04-10 | End: 2020-04-10

## 2020-04-10 RX ORDER — MORPHINE SULFATE 4 MG/ML
4 INJECTION, SOLUTION INTRAMUSCULAR; INTRAVENOUS EVERY 10 MIN PRN
Status: DISCONTINUED | OUTPATIENT
Start: 2020-04-10 | End: 2020-04-10

## 2020-04-10 RX ORDER — ACETAMINOPHEN 500 MG
1000 TABLET ORAL ONCE
Status: COMPLETED | OUTPATIENT
Start: 2020-04-10 | End: 2020-04-10

## 2020-04-10 RX ORDER — SCOLOPAMINE TRANSDERMAL SYSTEM 1 MG/1
1 PATCH, EXTENDED RELEASE TRANSDERMAL
Status: DISCONTINUED | OUTPATIENT
Start: 2020-04-10 | End: 2020-04-13 | Stop reason: HOSPADM

## 2020-04-10 RX ORDER — HYDROMORPHONE HYDROCHLORIDE 1 MG/ML
0.4 INJECTION, SOLUTION INTRAMUSCULAR; INTRAVENOUS; SUBCUTANEOUS EVERY 5 MIN PRN
Status: DISCONTINUED | OUTPATIENT
Start: 2020-04-10 | End: 2020-04-10

## 2020-04-10 RX ORDER — DEXAMETHASONE SODIUM PHOSPHATE 4 MG/ML
VIAL (ML) INJECTION AS NEEDED
Status: DISCONTINUED | OUTPATIENT
Start: 2020-04-10 | End: 2020-04-10 | Stop reason: SURG

## 2020-04-10 RX ORDER — IBUPROFEN 600 MG/1
600 TABLET ORAL EVERY 6 HOURS PRN
Qty: 30 TABLET | Refills: 0 | Status: SHIPPED | OUTPATIENT
Start: 2020-04-10 | End: 2020-04-24

## 2020-04-10 RX ORDER — OXYTOCIN 10 [USP'U]/ML
INJECTION, SOLUTION INTRAMUSCULAR; INTRAVENOUS AS NEEDED
Status: DISCONTINUED | OUTPATIENT
Start: 2020-04-10 | End: 2020-04-10 | Stop reason: SURG

## 2020-04-10 RX ORDER — HYDROMORPHONE HYDROCHLORIDE 1 MG/ML
0.6 INJECTION, SOLUTION INTRAMUSCULAR; INTRAVENOUS; SUBCUTANEOUS EVERY 5 MIN PRN
Status: DISCONTINUED | OUTPATIENT
Start: 2020-04-10 | End: 2020-04-10

## 2020-04-10 RX ORDER — METOCLOPRAMIDE 10 MG/1
10 TABLET ORAL ONCE
Status: DISCONTINUED | OUTPATIENT
Start: 2020-04-10 | End: 2020-04-10 | Stop reason: HOSPADM

## 2020-04-10 RX ORDER — FAMOTIDINE 20 MG/1
20 TABLET ORAL ONCE
Status: DISCONTINUED | OUTPATIENT
Start: 2020-04-10 | End: 2020-04-10 | Stop reason: HOSPADM

## 2020-04-10 RX ORDER — PHENYLEPHRINE HCL 10 MG/ML
VIAL (ML) INJECTION AS NEEDED
Status: DISCONTINUED | OUTPATIENT
Start: 2020-04-10 | End: 2020-04-10 | Stop reason: SURG

## 2020-04-10 RX ORDER — SODIUM CHLORIDE, SODIUM LACTATE, POTASSIUM CHLORIDE, CALCIUM CHLORIDE 600; 310; 30; 20 MG/100ML; MG/100ML; MG/100ML; MG/100ML
INJECTION, SOLUTION INTRAVENOUS CONTINUOUS
Status: DISCONTINUED | OUTPATIENT
Start: 2020-04-10 | End: 2020-04-10

## 2020-04-10 RX ORDER — HYDROCODONE BITARTRATE AND ACETAMINOPHEN 5; 325 MG/1; MG/1
1 TABLET ORAL AS NEEDED
Status: DISCONTINUED | OUTPATIENT
Start: 2020-04-10 | End: 2020-04-10

## 2020-04-10 RX ORDER — HYDROMORPHONE HYDROCHLORIDE 1 MG/ML
0.2 INJECTION, SOLUTION INTRAMUSCULAR; INTRAVENOUS; SUBCUTANEOUS EVERY 5 MIN PRN
Status: DISCONTINUED | OUTPATIENT
Start: 2020-04-10 | End: 2020-04-10

## 2020-04-10 RX ORDER — MORPHINE SULFATE 4 MG/ML
2 INJECTION, SOLUTION INTRAMUSCULAR; INTRAVENOUS EVERY 10 MIN PRN
Status: DISCONTINUED | OUTPATIENT
Start: 2020-04-10 | End: 2020-04-10

## 2020-04-10 RX ORDER — DIPHENHYDRAMINE HYDROCHLORIDE 50 MG/ML
25 INJECTION INTRAMUSCULAR; INTRAVENOUS EVERY 30 MIN PRN
Status: ACTIVE | OUTPATIENT
Start: 2020-04-10 | End: 2020-04-10

## 2020-04-10 RX ORDER — NALOXONE HYDROCHLORIDE 0.4 MG/ML
80 INJECTION, SOLUTION INTRAMUSCULAR; INTRAVENOUS; SUBCUTANEOUS AS NEEDED
Status: DISCONTINUED | OUTPATIENT
Start: 2020-04-10 | End: 2020-04-10

## 2020-04-10 RX ORDER — HYDROCODONE BITARTRATE AND ACETAMINOPHEN 5; 325 MG/1; MG/1
2 TABLET ORAL AS NEEDED
Status: DISCONTINUED | OUTPATIENT
Start: 2020-04-10 | End: 2020-04-10

## 2020-04-10 RX ORDER — MIDAZOLAM HYDROCHLORIDE 1 MG/ML
INJECTION INTRAMUSCULAR; INTRAVENOUS AS NEEDED
Status: DISCONTINUED | OUTPATIENT
Start: 2020-04-10 | End: 2020-04-10 | Stop reason: SURG

## 2020-04-10 RX ORDER — ONDANSETRON 2 MG/ML
INJECTION INTRAMUSCULAR; INTRAVENOUS AS NEEDED
Status: DISCONTINUED | OUTPATIENT
Start: 2020-04-10 | End: 2020-04-10 | Stop reason: SURG

## 2020-04-10 RX ADMIN — PHENYLEPHRINE HCL 100 MCG: 10 MG/ML VIAL (ML) INJECTION at 10:51:00

## 2020-04-10 RX ADMIN — OXYTOCIN 20 UNITS: 10 INJECTION, SOLUTION INTRAMUSCULAR; INTRAVENOUS at 10:40:00

## 2020-04-10 RX ADMIN — SODIUM CHLORIDE, SODIUM LACTATE, POTASSIUM CHLORIDE, CALCIUM CHLORIDE: 600; 310; 30; 20 INJECTION, SOLUTION INTRAVENOUS at 11:06:00

## 2020-04-10 RX ADMIN — LIDOCAINE HYDROCHLORIDE 50 MG: 10 INJECTION, SOLUTION EPIDURAL; INFILTRATION; INTRACAUDAL; PERINEURAL at 10:23:00

## 2020-04-10 RX ADMIN — DEXAMETHASONE SODIUM PHOSPHATE 4 MG: 4 MG/ML VIAL (ML) INJECTION at 10:48:00

## 2020-04-10 RX ADMIN — SODIUM CHLORIDE, SODIUM LACTATE, POTASSIUM CHLORIDE, CALCIUM CHLORIDE: 600; 310; 30; 20 INJECTION, SOLUTION INTRAVENOUS at 11:18:00

## 2020-04-10 RX ADMIN — ONDANSETRON 4 MG: 2 INJECTION INTRAMUSCULAR; INTRAVENOUS at 10:48:00

## 2020-04-10 RX ADMIN — KETOROLAC TROMETHAMINE 30 MG: 30 INJECTION, SOLUTION INTRAMUSCULAR; INTRAVENOUS at 10:34:00

## 2020-04-10 RX ADMIN — PHENYLEPHRINE HCL 100 MCG: 10 MG/ML VIAL (ML) INJECTION at 11:00:00

## 2020-04-10 RX ADMIN — SODIUM CHLORIDE, SODIUM LACTATE, POTASSIUM CHLORIDE, CALCIUM CHLORIDE: 600; 310; 30; 20 INJECTION, SOLUTION INTRAVENOUS at 11:07:00

## 2020-04-10 RX ADMIN — KETOROLAC TROMETHAMINE 30 MG: 30 INJECTION, SOLUTION INTRAMUSCULAR; INTRAVENOUS at 10:36:00

## 2020-04-10 RX ADMIN — MIDAZOLAM HYDROCHLORIDE 2 MG: 1 INJECTION INTRAMUSCULAR; INTRAVENOUS at 10:23:00

## 2020-04-10 NOTE — ANESTHESIA POSTPROCEDURE EVALUATION
Patient: Mynor Grimes    Procedure Summary     Date:  04/10/20 Room / Location:  [address] [place] [address] MAIN OR 10 / [address] [facility] MAIN OR    Anesthesia Start:  61 Anesthesia Stop:      Procedure:  [address] (N/A ) Diagnosis:       Missed       (

## 2020-04-10 NOTE — OPERATIVE REPORT
[address] [facility] Detailed Operative Note    Georgia Veloz Patient Status:  Hospital Outpatient Surgery    1985 MRN [id number]   Location [facility] Attending Cee Rodriguez MD   Hosp Day # 0 PCP Louann Berrios.  KAYLEN

## 2020-04-10 NOTE — DISCHARGE SUMMARY
[facility]    Outpatient Surgery Discharge Summary    Kate Roper Patient Status:  Hospital Outpatient Surgery    1985 MRN [id number]   Location [facility] Attending Abi Vázquez MD   Hosp Day # 0 PCP C

## 2020-04-10 NOTE — ANESTHESIA PROCEDURE NOTES
Airway  Date/Time: 4/10/2020 10:23 AM  Urgency: elective    Airway not difficult    General Information and Staff    Patient location during procedure: OR  Anesthesiologist: Bertha Flores MD  Performed: anesthesiologist     Indications and Patient Cond

## 2020-04-10 NOTE — ANESTHESIA PREPROCEDURE EVALUATION
Anesthesia PreOp Note    HPI:     Melvin Garrido is a 38year old female who presents for preoperative consultation requested by: Jovany Fleming MD    Date of Surgery: 4/10/2020    Procedure(s):  DILATION & CURETTAGE SUCTION  Indication: Missed  [ on file      Highest education level: Not on file    Occupational History      Not on file    Social Needs      Financial resource strain: Not on file      Food insecurity:        Worry: Not on file        Inability: Not on file      Transportation needs: RDW 13.0 04/01/2020    .0 04/01/2020    PREGS Positive (A) 04/01/2020             Vital Signs: Body mass index is 25.02 kg/m². height is 1.676 m (5' 6\") and weight is 70.3 kg (155 lb). Her oral temperature is 98.2 °F (36.8 °C).  Her blood pres

## 2020-04-21 ENCOUNTER — TELEPHONE (OUTPATIENT)
Dept: OBGYN UNIT | Facility: HOSPITAL | Age: 35
End: 2020-04-21

## 2020-04-24 ENCOUNTER — OFFICE VISIT (OUTPATIENT)
Dept: OBGYN CLINIC | Facility: CLINIC | Age: 35
End: 2020-04-24
Payer: COMMERCIAL

## 2020-04-24 VITALS
WEIGHT: 156 LBS | SYSTOLIC BLOOD PRESSURE: 115 MMHG | HEART RATE: 81 BPM | DIASTOLIC BLOOD PRESSURE: 81 MMHG | BODY MASS INDEX: 25 KG/M2

## 2020-04-24 DIAGNOSIS — Z98.890 POST-OPERATIVE STATE: Primary | ICD-10-CM

## 2020-04-24 PROCEDURE — 99024 POSTOP FOLLOW-UP VISIT: CPT | Performed by: OBSTETRICS & GYNECOLOGY

## 2020-04-24 NOTE — PROGRESS NOTES
Lamar Mayen is a 38year old female [id number] Patient's last menstrual period was 01/13/2020 (within days). Patient presents with: Follow - Up: D&C    Here for post op exam.  Had suction D&C on 4/10/20. Had 1200cc blood loss. Pathology--POC.   Chrom

## 2020-04-27 NOTE — PROGRESS NOTES
Spoke with pt. Chromosomes did not grow.   Contacted [facility] genetic counselor who will see if the sample can be run for microarray

## 2020-04-28 ENCOUNTER — TELEPHONE (OUTPATIENT)
Dept: OBGYN CLINIC | Facility: CLINIC | Age: 35
End: 2020-04-28

## 2020-04-28 DIAGNOSIS — O02.1 MISSED ABORTION: Primary | ICD-10-CM

## 2020-04-28 NOTE — TELEPHONE ENCOUNTER
REF LAB CALLED AND SAID THERE WAS NO GROWTH ON CHROMOSOMES. THEY WANT TO KNOW IF YOU WANT MICRO ARRAY TESTING? OR IS THE CHROMOSOME TEST RESULT ENOUGH?

## 2020-07-14 DIAGNOSIS — Z78.9 PARTICIPANT IN HEALTH AND WELLNESS PLAN: Primary | ICD-10-CM

## 2020-07-16 ENCOUNTER — TELEPHONE (OUTPATIENT)
Dept: FAMILY MEDICINE CLINIC | Facility: CLINIC | Age: 35
End: 2020-07-16

## 2020-07-20 ENCOUNTER — NURSE ONLY (OUTPATIENT)
Dept: LAB | Age: 35
End: 2020-07-20
Attending: PREVENTIVE MEDICINE

## 2020-07-20 DIAGNOSIS — Z78.9 PARTICIPANT IN HEALTH AND WELLNESS PLAN: ICD-10-CM

## 2020-07-20 PROCEDURE — 86769 SARS-COV-2 COVID-19 ANTIBODY: CPT

## 2020-07-21 LAB — SARS-COV-2 IGG SERPLBLD QL IA.RAPID: NEGATIVE

## 2020-07-23 ENCOUNTER — NURSE TRIAGE (OUTPATIENT)
Dept: FAMILY MEDICINE CLINIC | Facility: CLINIC | Age: 35
End: 2020-07-23

## 2020-11-03 ENCOUNTER — TELEPHONE (OUTPATIENT)
Dept: INTERNAL MEDICINE CLINIC | Facility: HOSPITAL | Age: 35
End: 2020-11-03

## 2020-11-03 ENCOUNTER — LAB ENCOUNTER (OUTPATIENT)
Dept: LAB | Age: 35
End: 2020-11-03
Attending: PREVENTIVE MEDICINE

## 2020-11-03 DIAGNOSIS — Z20.822 SUSPECTED 2019 NOVEL CORONAVIRUS INFECTION: Primary | ICD-10-CM

## 2020-11-03 DIAGNOSIS — Z20.822 SUSPECTED 2019 NOVEL CORONAVIRUS INFECTION: ICD-10-CM

## 2020-11-13 ENCOUNTER — OFFICE VISIT (OUTPATIENT)
Dept: OTHER | Facility: HOSPITAL | Age: 35
End: 2020-11-13
Attending: EMERGENCY MEDICINE

## 2020-11-13 DIAGNOSIS — Z77.21 PERSONAL HISTORY OF EXPOSURE TO POTENTIALLY HAZARDOUS BODY FLUIDS: Primary | ICD-10-CM

## 2020-11-13 PROCEDURE — 86706 HEP B SURFACE ANTIBODY: CPT

## 2020-11-13 PROCEDURE — 86803 HEPATITIS C AB TEST: CPT

## 2020-11-13 PROCEDURE — 87389 HIV-1 AG W/HIV-1&-2 AB AG IA: CPT

## 2020-12-14 ENCOUNTER — TELEPHONE (OUTPATIENT)
Dept: OBGYN CLINIC | Facility: CLINIC | Age: 35
End: 2020-12-14

## 2020-12-14 NOTE — TELEPHONE ENCOUNTER
Pt. Reports LMP 10/31/20 with + HPT.  OBN PC appt scheduled 12-31-20 at 10 am and her NOB with ANTONK On 1/6/20 at 10:40 am

## 2020-12-31 ENCOUNTER — TELEPHONE (OUTPATIENT)
Dept: OBGYN CLINIC | Facility: CLINIC | Age: 35
End: 2020-12-31

## 2020-12-31 ENCOUNTER — NURSE ONLY (OUTPATIENT)
Dept: OBGYN CLINIC | Facility: CLINIC | Age: 35
End: 2020-12-31
Payer: COMMERCIAL

## 2020-12-31 VITALS — BODY MASS INDEX: 25 KG/M2 | HEIGHT: 66 IN

## 2020-12-31 DIAGNOSIS — Z34.81 ENCOUNTER FOR SUPERVISION OF OTHER NORMAL PREGNANCY IN FIRST TRIMESTER: Primary | ICD-10-CM

## 2020-12-31 PROBLEM — O09.529 ANTEPARTUM MULTIGRAVIDA OF ADVANCED MATERNAL AGE (HCC): Status: RESOLVED | Noted: 2020-04-03 | Resolved: 2020-12-31

## 2020-12-31 PROBLEM — O09.529 ANTEPARTUM MULTIGRAVIDA OF ADVANCED MATERNAL AGE: Status: RESOLVED | Noted: 2020-04-03 | Resolved: 2020-12-31

## 2020-12-31 RX ORDER — GLYCERIN/MINERAL OIL
LOTION (ML) TOPICAL
COMMUNITY

## 2020-12-31 NOTE — TELEPHONE ENCOUNTER
Pt had her OBN PC appt today. Pt states that her periods are regular, but come every 21 days. Sent to MD on Call, [facility], if you want an ob u/s? Pt had her last tattoo when she was 24 yrs old. She is 35 yrs now.   Last Hep C done 6/2016 and it was non r

## 2021-01-04 ENCOUNTER — LAB ENCOUNTER (OUTPATIENT)
Dept: LAB | Facility: HOSPITAL | Age: 36
End: 2021-01-04
Attending: OBSTETRICS & GYNECOLOGY
Payer: COMMERCIAL

## 2021-01-04 DIAGNOSIS — Z34.81 ENCOUNTER FOR SUPERVISION OF OTHER NORMAL PREGNANCY IN FIRST TRIMESTER: ICD-10-CM

## 2021-01-04 LAB
ANTIBODY SCREEN: NEGATIVE
BASOPHILS # BLD AUTO: 0.03 X10(3) UL (ref 0–0.2)
BASOPHILS NFR BLD AUTO: 0.4 %
DEPRECATED RDW RBC AUTO: 46.5 FL (ref 35.1–46.3)
EOSINOPHIL # BLD AUTO: 0.24 X10(3) UL (ref 0–0.7)
EOSINOPHIL NFR BLD AUTO: 3.1 %
ERYTHROCYTE [DISTWIDTH] IN BLOOD BY AUTOMATED COUNT: 14 % (ref 11–15)
GLUCOSE 1H P GLC SERPL-MCNC: 133 MG/DL
HBV SURFACE AG SER-ACNC: <0.1 [IU]/L
HBV SURFACE AG SERPL QL IA: NONREACTIVE
HCG SERPL QL: POSITIVE
HCT VFR BLD AUTO: 38.1 %
HGB BLD-MCNC: 12.7 G/DL
IMM GRANULOCYTES # BLD AUTO: 0.02 X10(3) UL (ref 0–1)
IMM GRANULOCYTES NFR BLD: 0.3 %
LYMPHOCYTES # BLD AUTO: 1.42 X10(3) UL (ref 1–4)
LYMPHOCYTES NFR BLD AUTO: 18.6 %
MCH RBC QN AUTO: 30.3 PG (ref 26–34)
MCHC RBC AUTO-ENTMCNC: 33.3 G/DL (ref 31–37)
MCV RBC AUTO: 90.9 FL
MONOCYTES # BLD AUTO: 0.51 X10(3) UL (ref 0.1–1)
MONOCYTES NFR BLD AUTO: 6.7 %
NEUTROPHILS # BLD AUTO: 5.42 X10 (3) UL (ref 1.5–7.7)
NEUTROPHILS # BLD AUTO: 5.42 X10(3) UL (ref 1.5–7.7)
NEUTROPHILS NFR BLD AUTO: 70.9 %
PLATELET # BLD AUTO: 307 10(3)UL (ref 150–450)
RBC # BLD AUTO: 4.19 X10(6)UL
RH BLOOD TYPE: POSITIVE
RUBV IGG SER QL: POSITIVE
RUBV IGG SER-ACNC: 201.9 IU/ML (ref 10–?)
WBC # BLD AUTO: 7.6 X10(3) UL (ref 4–11)

## 2021-01-04 PROCEDURE — 86901 BLOOD TYPING SEROLOGIC RH(D): CPT

## 2021-01-04 PROCEDURE — 84703 CHORIONIC GONADOTROPIN ASSAY: CPT

## 2021-01-04 PROCEDURE — 86850 RBC ANTIBODY SCREEN: CPT

## 2021-01-04 PROCEDURE — 87340 HEPATITIS B SURFACE AG IA: CPT

## 2021-01-04 PROCEDURE — 86900 BLOOD TYPING SEROLOGIC ABO: CPT

## 2021-01-04 PROCEDURE — 86780 TREPONEMA PALLIDUM: CPT

## 2021-01-04 PROCEDURE — 85025 COMPLETE CBC W/AUTO DIFF WBC: CPT

## 2021-01-04 PROCEDURE — 82950 GLUCOSE TEST: CPT

## 2021-01-04 PROCEDURE — 86762 RUBELLA ANTIBODY: CPT

## 2021-01-04 PROCEDURE — 36415 COLL VENOUS BLD VENIPUNCTURE: CPT

## 2021-01-04 PROCEDURE — 87086 URINE CULTURE/COLONY COUNT: CPT

## 2021-01-04 PROCEDURE — 87389 HIV-1 AG W/HIV-1&-2 AB AG IA: CPT

## 2021-01-04 NOTE — TELEPHONE ENCOUNTER
Advised pt no referral needed, since IHP is automatically approved through health system.  Verbalize understandings

## 2021-01-04 NOTE — TELEPHONE ENCOUNTER
[facility] stated no further Hep C needs to be done. LMTCB. Pt needs to be informed pt that [facility] stated okay for dating ob US. Pt to check insurance for coverage.

## 2021-01-06 ENCOUNTER — INITIAL PRENATAL (OUTPATIENT)
Dept: OBGYN CLINIC | Facility: CLINIC | Age: 36
End: 2021-01-06
Payer: COMMERCIAL

## 2021-01-06 ENCOUNTER — TELEPHONE (OUTPATIENT)
Dept: OBGYN CLINIC | Facility: CLINIC | Age: 36
End: 2021-01-06

## 2021-01-06 VITALS
WEIGHT: 161 LBS | SYSTOLIC BLOOD PRESSURE: 120 MMHG | HEART RATE: 88 BPM | DIASTOLIC BLOOD PRESSURE: 85 MMHG | BODY MASS INDEX: 26 KG/M2

## 2021-01-06 DIAGNOSIS — O09.521 AMA (ADVANCED MATERNAL AGE) MULTIGRAVIDA 35+, FIRST TRIMESTER: Primary | ICD-10-CM

## 2021-01-06 DIAGNOSIS — Z34.91 ENCOUNTER FOR SUPERVISION OF NORMAL PREGNANCY IN FIRST TRIMESTER, UNSPECIFIED GRAVIDITY: Primary | ICD-10-CM

## 2021-01-06 PROBLEM — O09.529 AMA (ADVANCED MATERNAL AGE) MULTIGRAVIDA 35+: Status: ACTIVE | Noted: 2021-01-06

## 2021-01-06 PROBLEM — O09.529 AMA (ADVANCED MATERNAL AGE) MULTIGRAVIDA 35+ (HCC): Status: ACTIVE | Noted: 2021-01-06

## 2021-01-06 LAB
MULTISTIX LOT#: 1044 NUMERIC
NITRITE, URINE: 0.2
OCCULT BLOOD: 1.02
PROTEIN (URINE DIPSTICK): 5 MG/DL
SPECIFIC GRAVITY: 1.01 (ref 1–1.03)
T PALLIDUM AB SER QL: NEGATIVE
URINE-COLOR: YELLOW

## 2021-01-06 PROCEDURE — 3079F DIAST BP 80-89 MM HG: CPT | Performed by: OBSTETRICS & GYNECOLOGY

## 2021-01-06 PROCEDURE — 81002 URINALYSIS NONAUTO W/O SCOPE: CPT | Performed by: OBSTETRICS & GYNECOLOGY

## 2021-01-06 PROCEDURE — 76815 OB US LIMITED FETUS(S): CPT | Performed by: OBSTETRICS & GYNECOLOGY

## 2021-01-06 PROCEDURE — 3074F SYST BP LT 130 MM HG: CPT | Performed by: OBSTETRICS & GYNECOLOGY

## 2021-01-06 NOTE — PROGRESS NOTES
Pap 4/2020. Gc/chlamydia/trichomonas. Wants FTS. Did not do dating ultrasound. Bedside ultrasound--+FHT, c/w dates.   RTC 4 wk

## 2021-01-07 LAB
C TRACH DNA SPEC QL NAA+PROBE: NEGATIVE
N GONORRHOEA DNA SPEC QL NAA+PROBE: NEGATIVE
T VAGINALIS RRNA SPEC QL NAA+PROBE: NEGATIVE

## 2021-01-12 ENCOUNTER — TELEPHONE (OUTPATIENT)
Dept: OBGYN CLINIC | Facility: CLINIC | Age: 36
End: 2021-01-12

## 2021-01-12 NOTE — TELEPHONE ENCOUNTER
Pt. Scheduled for JONAH appt 2/5/21 at 11am with [facility]

## 2021-02-01 ENCOUNTER — HOSPITAL ENCOUNTER (OUTPATIENT)
Dept: PERINATAL CARE | Facility: HOSPITAL | Age: 36
Discharge: HOME | End: 2021-02-01
Attending: OBSTETRICS & GYNECOLOGY
Payer: COMMERCIAL

## 2021-02-01 VITALS
BODY MASS INDEX: 26 KG/M2 | HEART RATE: 93 BPM | DIASTOLIC BLOOD PRESSURE: 81 MMHG | SYSTOLIC BLOOD PRESSURE: 128 MMHG | WEIGHT: 162 LBS

## 2021-02-01 DIAGNOSIS — O35.9XX0 FETAL ABNORMALITY AFFECTING MANAGEMENT OF MOTHER, SINGLE OR UNSPECIFIED FETUS: ICD-10-CM

## 2021-02-01 DIAGNOSIS — O09.521 MULTIGRAVIDA OF ADVANCED MATERNAL AGE IN FIRST TRIMESTER: ICD-10-CM

## 2021-02-01 DIAGNOSIS — Z36.9 FIRST TRIMESTER SCREENING: ICD-10-CM

## 2021-02-01 DIAGNOSIS — O03.9 SPONTANEOUS ABORTION IN FIRST TRIMESTER: ICD-10-CM

## 2021-02-01 DIAGNOSIS — O09.521 AMA (ADVANCED MATERNAL AGE) MULTIGRAVIDA 35+, FIRST TRIMESTER: ICD-10-CM

## 2021-02-01 DIAGNOSIS — Z36.9 FIRST TRIMESTER SCREENING: Primary | ICD-10-CM

## 2021-02-01 DIAGNOSIS — Z71.89: Primary | ICD-10-CM

## 2021-02-01 PROCEDURE — 76801 OB US < 14 WKS SINGLE FETUS: CPT | Performed by: OBSTETRICS & GYNECOLOGY

## 2021-02-01 PROCEDURE — 76813 OB US NUCHAL MEAS 1 GEST: CPT | Performed by: OBSTETRICS & GYNECOLOGY

## 2021-02-01 PROCEDURE — 99243 OFF/OP CNSLTJ NEW/EST LOW 30: CPT | Performed by: OBSTETRICS & GYNECOLOGY

## 2021-02-01 NOTE — PROGRESS NOTES
Outpatient Maternal-Fetal Medicine Consultation    Dear Dr. An Matute    Thank you for requesting ultrasound evaluation and maternal fetal medicine consultation on your patient Savage Peoples.   As you are aware she is a 38year old female [id number] with a sin demise   • TONSILLECTOMY     • WISDOM TEETH REMOVED        Family History   Problem Relation Age of Onset   • Thyroid disease Father         hypothyroidism   • Thyroid disease Mother         hypothyroidism   • Prostate Cancer Maternal Grandfather    • Diab tests as well as the procedure associated loss rate (1:500 for genetic amniocentesis). Antiphospholipid antibody testing criteria discussed. We reviewed that it is possible that she has had 2 pregnancies complicated by anomalies leading to losses.   H

## 2021-02-02 ENCOUNTER — LAB ENCOUNTER (OUTPATIENT)
Dept: LAB | Facility: HOSPITAL | Age: 36
End: 2021-02-02
Attending: OBSTETRICS & GYNECOLOGY
Payer: COMMERCIAL

## 2021-02-02 ENCOUNTER — TELEPHONE (OUTPATIENT)
Dept: OBGYN CLINIC | Facility: CLINIC | Age: 36
End: 2021-02-02

## 2021-02-02 DIAGNOSIS — Z71.89: ICD-10-CM

## 2021-02-02 PROCEDURE — 85390 FIBRINOLYSINS SCREEN I&R: CPT

## 2021-02-02 PROCEDURE — 85598 HEXAGNAL PHOSPH PLTLT NEUTRL: CPT

## 2021-02-02 PROCEDURE — 86147 CARDIOLIPIN ANTIBODY EA IG: CPT

## 2021-02-02 PROCEDURE — 85610 PROTHROMBIN TIME: CPT

## 2021-02-02 PROCEDURE — 85613 RUSSELL VIPER VENOM DILUTED: CPT

## 2021-02-02 PROCEDURE — 85730 THROMBOPLASTIN TIME PARTIAL: CPT

## 2021-02-02 PROCEDURE — 86146 BETA-2 GLYCOPROTEIN ANTIBODY: CPT

## 2021-02-02 PROCEDURE — 36415 COLL VENOUS BLD VENIPUNCTURE: CPT

## 2021-02-02 NOTE — TELEPHONE ENCOUNTER
Pt. With a 13 wk fetal demise requesting to have a Cytotec induction at [facility]. Spoke With [address] on call to inform her of pt wishes. [facility] called and Cytotec induction scheduled for 2/5/21 at 8am. Pt. And [facility] informed.

## 2021-02-03 ENCOUNTER — TELEPHONE (OUTPATIENT)
Dept: OBGYN UNIT | Facility: HOSPITAL | Age: 36
End: 2021-02-03

## 2021-02-03 DIAGNOSIS — Z87.59 ULTRASOUND SCAN TO CONFIRM FETAL VIABILITY WITH HISTORY OF MISCARRIAGE: Primary | ICD-10-CM

## 2021-02-03 DIAGNOSIS — O36.80X0 ULTRASOUND SCAN TO CONFIRM FETAL VIABILITY WITH HISTORY OF MISCARRIAGE: Primary | ICD-10-CM

## 2021-02-03 LAB
APTT PPP: 29.8 SECONDS (ref 23.2–35.3)
CONFIRM APTT STACLOT: NEGATIVE
CONFIRM DRVVT: 1 S (ref 0–1.1)
PROTHROMBIN TIME: 12.6 SECONDS (ref 11.8–14.5)

## 2021-02-03 NOTE — PROGRESS NOTES
Dear Ms. Wellington Later,    This is the result of your  ultrasound from Monday that we discussed in person. Simona Blue.

## 2021-02-05 ENCOUNTER — HOSPITAL ENCOUNTER (INPATIENT)
Facility: HOSPITAL | Age: 36
LOS: 1 days | Discharge: ANOTHER HOSPITAL | DRG: 779 | End: 2021-02-06
Attending: OBSTETRICS & GYNECOLOGY | Admitting: OBSTETRICS & GYNECOLOGY
Payer: COMMERCIAL

## 2021-02-05 ENCOUNTER — APPOINTMENT (OUTPATIENT)
Dept: OBGYN CLINIC | Facility: HOSPITAL | Age: 36
DRG: 779 | End: 2021-02-05
Attending: OBSTETRICS & GYNECOLOGY
Payer: COMMERCIAL

## 2021-02-05 PROBLEM — Z34.90 PREGNANCY (HCC): Status: ACTIVE | Noted: 2021-02-05

## 2021-02-05 PROBLEM — Z34.90 PREGNANCY: Status: ACTIVE | Noted: 2021-02-05

## 2021-02-05 LAB
ANTIBODY SCREEN: NEGATIVE
BASOPHILS # BLD AUTO: 0.02 X10(3) UL (ref 0–0.2)
BASOPHILS NFR BLD AUTO: 0.3 %
BETA 2 GLYCOPROTEIN 1 AB, IGG: <0.6 U/ML (ref ?–7)
BETA 2 GLYCOPROTEIN 1 AB, IGM: <2.9 U/ML (ref ?–7)
CARDIOLIPIN IGG SERPL-ACNC: 1.3 GPL (ref ?–10)
CARDIOLIPIN IGM SERPL-ACNC: 36 MPL (ref ?–10)
DEPRECATED RDW RBC AUTO: 45.2 FL (ref 35.1–46.3)
EOSINOPHIL # BLD AUTO: 0.34 X10(3) UL (ref 0–0.7)
EOSINOPHIL NFR BLD AUTO: 4.8 %
ERYTHROCYTE [DISTWIDTH] IN BLOOD BY AUTOMATED COUNT: 13.3 % (ref 11–15)
HCT VFR BLD AUTO: 39.5 %
HGB BLD-MCNC: 13.1 G/DL
IMM GRANULOCYTES # BLD AUTO: 0.03 X10(3) UL (ref 0–1)
IMM GRANULOCYTES NFR BLD: 0.4 %
LYMPHOCYTES # BLD AUTO: 1.4 X10(3) UL (ref 1–4)
LYMPHOCYTES NFR BLD AUTO: 19.8 %
MCH RBC QN AUTO: 30.3 PG (ref 26–34)
MCHC RBC AUTO-ENTMCNC: 33.2 G/DL (ref 31–37)
MCV RBC AUTO: 91.2 FL
MONOCYTES # BLD AUTO: 0.51 X10(3) UL (ref 0.1–1)
MONOCYTES NFR BLD AUTO: 7.2 %
NEUTROPHILS # BLD AUTO: 4.76 X10 (3) UL (ref 1.5–7.7)
NEUTROPHILS # BLD AUTO: 4.76 X10(3) UL (ref 1.5–7.7)
NEUTROPHILS NFR BLD AUTO: 67.5 %
PLATELET # BLD AUTO: 262 10(3)UL (ref 150–450)
RBC # BLD AUTO: 4.33 X10(6)UL
RH BLOOD TYPE: POSITIVE
SARS-COV-2 RNA RESP QL NAA+PROBE: NOT DETECTED
WBC # BLD AUTO: 7.1 X10(3) UL (ref 4–11)

## 2021-02-05 PROCEDURE — 86850 RBC ANTIBODY SCREEN: CPT | Performed by: OBSTETRICS & GYNECOLOGY

## 2021-02-05 PROCEDURE — 85025 COMPLETE CBC W/AUTO DIFF WBC: CPT | Performed by: OBSTETRICS & GYNECOLOGY

## 2021-02-05 PROCEDURE — 86900 BLOOD TYPING SEROLOGIC ABO: CPT | Performed by: OBSTETRICS & GYNECOLOGY

## 2021-02-05 PROCEDURE — 86901 BLOOD TYPING SEROLOGIC RH(D): CPT | Performed by: OBSTETRICS & GYNECOLOGY

## 2021-02-05 PROCEDURE — 10A07ZX ABORTION OF PRODUCTS OF CONCEPTION, ABORTIFACIENT, VIA NATURAL OR ARTIFICIAL OPENING: ICD-10-PCS | Performed by: OBSTETRICS & GYNECOLOGY

## 2021-02-05 RX ORDER — IBUPROFEN 600 MG/1
600 TABLET ORAL EVERY 6 HOURS PRN
Status: DISCONTINUED | OUTPATIENT
Start: 2021-02-05 | End: 2021-02-06

## 2021-02-05 RX ORDER — ONDANSETRON 2 MG/ML
4 INJECTION INTRAMUSCULAR; INTRAVENOUS EVERY 6 HOURS PRN
Status: DISCONTINUED | OUTPATIENT
Start: 2021-02-05 | End: 2021-02-06

## 2021-02-05 RX ORDER — DEXTROSE, SODIUM CHLORIDE, SODIUM LACTATE, POTASSIUM CHLORIDE, AND CALCIUM CHLORIDE 5; .6; .31; .03; .02 G/100ML; G/100ML; G/100ML; G/100ML; G/100ML
INJECTION, SOLUTION INTRAVENOUS CONTINUOUS
Status: DISCONTINUED | OUTPATIENT
Start: 2021-02-05 | End: 2021-02-06

## 2021-02-05 RX ORDER — ACETAMINOPHEN 500 MG
500 TABLET ORAL EVERY 6 HOURS PRN
Status: DISCONTINUED | OUTPATIENT
Start: 2021-02-05 | End: 2021-02-06

## 2021-02-05 RX ORDER — MISOPROSTOL 200 UG/1
400 TABLET ORAL
Status: DISCONTINUED | OUTPATIENT
Start: 2021-02-05 | End: 2021-02-06

## 2021-02-05 NOTE — H&P
Arabella Brown 35. Patient Status:  Inpatient    1985 MRN [id number]   Location [address] [place] [address] Attending Page, Pippa Sanchez MD   Hosp Day # 0 PCP Amie Marks.  Alvaro Mann,      Date History:   Past Surgical History:   Procedure Laterality Date   • D & C     • DILATION & CURETTAGE SUCTION N/A 4/10/2020    Performed by Catrachita Arias MD at [address] [place] [address] OR   •   (9 hr. labor);    • OTHER SURGICAL HISTORY      suction D&C for 12 wk f

## 2021-02-05 NOTE — CM/SW NOTE
MDO to SW for IUFD. Share resources provided to pt via Peg Anis (Prenatal Bereavement Coordinator). SW/CM to remain available for support and/or discharge planning.      ALESSANDRA Delarosa, [facility]  Social Work   XBQ:#[phone]

## 2021-02-05 NOTE — PROGRESS NOTES
Pt is a 38year old female admitted to 375/375-A. Patient presents with:  Iufd     Pt is B1T8220 13w6d intra-uterine pregnancy. History obtained, consents signed. Oriented to room, staff, and plan of care.

## 2021-02-05 NOTE — PROGRESS NOTES
Pt lying in bed and pt's  sitting on the couch. Pt reported that they have had a few days to process the death and now are just waiting on delivery. Pt said \"we are doing the best we can. \" Pt was teary.  Pt and  requested a blessing after th

## 2021-02-06 ENCOUNTER — HOSPITAL ENCOUNTER (INPATIENT)
Facility: HOSPITAL | Age: 36
LOS: 1 days | Discharge: HOME | DRG: 770 | End: 2021-02-07
Attending: OBSTETRICS & GYNECOLOGY | Admitting: OBSTETRICS & GYNECOLOGY
Payer: COMMERCIAL

## 2021-02-06 VITALS
TEMPERATURE: 99 F | HEART RATE: 86 BPM | DIASTOLIC BLOOD PRESSURE: 83 MMHG | RESPIRATION RATE: 16 BRPM | SYSTOLIC BLOOD PRESSURE: 125 MMHG

## 2021-02-06 DIAGNOSIS — O02.1 FETAL DEMISE BEFORE 20 WEEKS WITH RETENTION OF DEAD FETUS: ICD-10-CM

## 2021-02-06 LAB
ANTIBODY SCREEN: NEGATIVE
BASOPHILS # BLD AUTO: 0.04 X10(3) UL (ref 0–0.2)
BASOPHILS NFR BLD AUTO: 0.4 %
DEPRECATED RDW RBC AUTO: 44.5 FL (ref 35.1–46.3)
EOSINOPHIL # BLD AUTO: 0.31 X10(3) UL (ref 0–0.7)
EOSINOPHIL NFR BLD AUTO: 3.3 %
ERYTHROCYTE [DISTWIDTH] IN BLOOD BY AUTOMATED COUNT: 13.2 % (ref 11–15)
HCT VFR BLD AUTO: 35.4 %
HGB BLD-MCNC: 12 G/DL
IMM GRANULOCYTES # BLD AUTO: 0.03 X10(3) UL (ref 0–1)
IMM GRANULOCYTES NFR BLD: 0.3 %
LYMPHOCYTES # BLD AUTO: 0.96 X10(3) UL (ref 1–4)
LYMPHOCYTES NFR BLD AUTO: 10.2 %
MCH RBC QN AUTO: 31.2 PG (ref 26–34)
MCHC RBC AUTO-ENTMCNC: 33.9 G/DL (ref 31–37)
MCV RBC AUTO: 91.9 FL
MONOCYTES # BLD AUTO: 0.56 X10(3) UL (ref 0.1–1)
MONOCYTES NFR BLD AUTO: 5.9 %
NEUTROPHILS # BLD AUTO: 7.55 X10 (3) UL (ref 1.5–7.7)
NEUTROPHILS # BLD AUTO: 7.55 X10(3) UL (ref 1.5–7.7)
NEUTROPHILS NFR BLD AUTO: 79.9 %
PLATELET # BLD AUTO: 246 10(3)UL (ref 150–450)
RBC # BLD AUTO: 3.85 X10(6)UL
RH BLOOD TYPE: POSITIVE
WBC # BLD AUTO: 9.5 X10(3) UL (ref 4–11)

## 2021-02-06 PROCEDURE — 86850 RBC ANTIBODY SCREEN: CPT | Performed by: OBSTETRICS & GYNECOLOGY

## 2021-02-06 PROCEDURE — 85025 COMPLETE CBC W/AUTO DIFF WBC: CPT | Performed by: OBSTETRICS & GYNECOLOGY

## 2021-02-06 PROCEDURE — 86901 BLOOD TYPING SEROLOGIC RH(D): CPT | Performed by: OBSTETRICS & GYNECOLOGY

## 2021-02-06 PROCEDURE — 86900 BLOOD TYPING SEROLOGIC ABO: CPT | Performed by: OBSTETRICS & GYNECOLOGY

## 2021-02-06 RX ORDER — DOCUSATE SODIUM 100 MG/1
100 CAPSULE, LIQUID FILLED ORAL 2 TIMES DAILY
Status: DISCONTINUED | OUTPATIENT
Start: 2021-02-06 | End: 2021-02-07

## 2021-02-06 RX ORDER — ACETAMINOPHEN 500 MG
500 TABLET ORAL EVERY 6 HOURS PRN
Status: DISCONTINUED | OUTPATIENT
Start: 2021-02-06 | End: 2021-02-07

## 2021-02-06 RX ORDER — ACETAMINOPHEN 500 MG
1000 TABLET ORAL EVERY 6 HOURS PRN
Status: DISCONTINUED | OUTPATIENT
Start: 2021-02-06 | End: 2021-02-07

## 2021-02-06 RX ORDER — CALCIUM CARBONATE 200(500)MG
1000 TABLET,CHEWABLE ORAL
Status: DISCONTINUED | OUTPATIENT
Start: 2021-02-06 | End: 2021-02-07

## 2021-02-06 RX ORDER — ZOLPIDEM TARTRATE 5 MG/1
5 TABLET ORAL NIGHTLY PRN
Status: DISCONTINUED | OUTPATIENT
Start: 2021-02-06 | End: 2021-02-07

## 2021-02-06 NOTE — PROGRESS NOTES
CP spoke with RN who updated CP on Pt.'s circumstance. CP visited with Pt. Sheron shed tears as she shared personally. CP provided empathic listening and grief support in light of her fetal demise.  Sheron and CP discussed her elena and God's presence

## 2021-02-06 NOTE — PROGRESS NOTES
This RN relinquishing care of patient at this time. Care endorsed and report given to [facility].

## 2021-02-06 NOTE — PROGRESS NOTES
2/6/2021, 3:33 PM    Subjective: In room to check patient. Patient with cervical exam per me 1cm and thick. Pt not feeling contractions or cramping. She is tearful at this time. Back in room a little later due to patient wanting to talk. Pt now wants D&E.

## 2021-02-06 NOTE — PROGRESS NOTES
Next dose of cytotec placed; cervix still 1/80%  Passing small clots and ?piece of placenta  VSS; no lightheadness or dizziness  Plan to continue cytotec IOL

## 2021-02-06 NOTE — PROGRESS NOTES
Patient transported to [address] via ambulance for Dilation and Evacuation. Report given to [facility] [place] [address]

## 2021-02-06 NOTE — PROGRESS NOTES
Assuming care of patient at this time, report received from ERNESTO Li RN. Patient received resting in bed, denies feeling and pain, pressure, or discomfort. Significant other supportive and at bedside.  POC reviewed with patient who is agreeable and denie

## 2021-02-06 NOTE — PROGRESS NOTES
Pt seen and examined. No acute issues overnight.     S/p 6 doses of cytotec 400mcg without delivery  HIgh dose pitocin protocol started; now starting to have stronger cramping  BSUS performed and fetus in BABAK/cervix  Cx 3/80/-2 and palpable fetal parts  CP

## 2021-02-07 ENCOUNTER — ANESTHESIA (OUTPATIENT)
Dept: SURGERY | Facility: HOSPITAL | Age: 36
DRG: 770 | End: 2021-02-07
Payer: COMMERCIAL

## 2021-02-07 ENCOUNTER — ANESTHESIA EVENT (OUTPATIENT)
Dept: SURGERY | Facility: HOSPITAL | Age: 36
DRG: 770 | End: 2021-02-07
Payer: COMMERCIAL

## 2021-02-07 ENCOUNTER — APPOINTMENT (OUTPATIENT)
Dept: ULTRASOUND IMAGING | Facility: HOSPITAL | Age: 36
DRG: 770 | End: 2021-02-07
Attending: OBSTETRICS & GYNECOLOGY
Payer: COMMERCIAL

## 2021-02-07 VITALS
RESPIRATION RATE: 20 BRPM | DIASTOLIC BLOOD PRESSURE: 69 MMHG | HEART RATE: 79 BPM | SYSTOLIC BLOOD PRESSURE: 108 MMHG | OXYGEN SATURATION: 100 % | TEMPERATURE: 99 F

## 2021-02-07 PROCEDURE — 88233 TISSUE CULTURE SKIN/BIOPSY: CPT | Performed by: OBSTETRICS & GYNECOLOGY

## 2021-02-07 PROCEDURE — 88291 CYTO/MOLECULAR REPORT: CPT | Performed by: OBSTETRICS & GYNECOLOGY

## 2021-02-07 PROCEDURE — 88305 TISSUE EXAM BY PATHOLOGIST: CPT | Performed by: OBSTETRICS & GYNECOLOGY

## 2021-02-07 PROCEDURE — 76998 US GUIDE INTRAOP: CPT | Performed by: OBSTETRICS & GYNECOLOGY

## 2021-02-07 PROCEDURE — 88262 CHROMOSOME ANALYSIS 15-20: CPT | Performed by: OBSTETRICS & GYNECOLOGY

## 2021-02-07 PROCEDURE — 10D17ZZ EXTRACTION OF PRODUCTS OF CONCEPTION, RETAINED, VIA NATURAL OR ARTIFICIAL OPENING: ICD-10-PCS | Performed by: OBSTETRICS & GYNECOLOGY

## 2021-02-07 RX ORDER — METOCLOPRAMIDE HYDROCHLORIDE 5 MG/ML
10 INJECTION INTRAMUSCULAR; INTRAVENOUS AS NEEDED
Status: DISCONTINUED | OUTPATIENT
Start: 2021-02-07 | End: 2021-02-07 | Stop reason: HOSPADM

## 2021-02-07 RX ORDER — METOCLOPRAMIDE HYDROCHLORIDE 5 MG/ML
INJECTION INTRAMUSCULAR; INTRAVENOUS AS NEEDED
Status: DISCONTINUED | OUTPATIENT
Start: 2021-02-07 | End: 2021-02-07 | Stop reason: SURG

## 2021-02-07 RX ORDER — ONDANSETRON 2 MG/ML
4 INJECTION INTRAMUSCULAR; INTRAVENOUS AS NEEDED
Status: DISCONTINUED | OUTPATIENT
Start: 2021-02-07 | End: 2021-02-07 | Stop reason: HOSPADM

## 2021-02-07 RX ORDER — HYDROMORPHONE HYDROCHLORIDE 1 MG/ML
0.4 INJECTION, SOLUTION INTRAMUSCULAR; INTRAVENOUS; SUBCUTANEOUS EVERY 5 MIN PRN
Status: DISCONTINUED | OUTPATIENT
Start: 2021-02-07 | End: 2021-02-07 | Stop reason: HOSPADM

## 2021-02-07 RX ORDER — SODIUM CHLORIDE, SODIUM LACTATE, POTASSIUM CHLORIDE, CALCIUM CHLORIDE 600; 310; 30; 20 MG/100ML; MG/100ML; MG/100ML; MG/100ML
INJECTION, SOLUTION INTRAVENOUS CONTINUOUS
Status: DISCONTINUED | OUTPATIENT
Start: 2021-02-07 | End: 2021-02-07

## 2021-02-07 RX ORDER — HYDROCODONE BITARTRATE AND ACETAMINOPHEN 5; 325 MG/1; MG/1
1 TABLET ORAL AS NEEDED
Status: DISCONTINUED | OUTPATIENT
Start: 2021-02-07 | End: 2021-02-07 | Stop reason: HOSPADM

## 2021-02-07 RX ORDER — HYDROCODONE BITARTRATE AND ACETAMINOPHEN 5; 325 MG/1; MG/1
2 TABLET ORAL AS NEEDED
Status: DISCONTINUED | OUTPATIENT
Start: 2021-02-07 | End: 2021-02-07 | Stop reason: HOSPADM

## 2021-02-07 RX ORDER — NALOXONE HYDROCHLORIDE 0.4 MG/ML
80 INJECTION, SOLUTION INTRAMUSCULAR; INTRAVENOUS; SUBCUTANEOUS AS NEEDED
Status: DISCONTINUED | OUTPATIENT
Start: 2021-02-07 | End: 2021-02-07 | Stop reason: HOSPADM

## 2021-02-07 RX ORDER — SODIUM CHLORIDE, SODIUM LACTATE, POTASSIUM CHLORIDE, CALCIUM CHLORIDE 600; 310; 30; 20 MG/100ML; MG/100ML; MG/100ML; MG/100ML
INJECTION, SOLUTION INTRAVENOUS CONTINUOUS
Status: DISCONTINUED | OUTPATIENT
Start: 2021-02-07 | End: 2021-02-07 | Stop reason: HOSPADM

## 2021-02-07 RX ORDER — KETOROLAC TROMETHAMINE 30 MG/ML
INJECTION, SOLUTION INTRAMUSCULAR; INTRAVENOUS AS NEEDED
Status: DISCONTINUED | OUTPATIENT
Start: 2021-02-07 | End: 2021-02-07 | Stop reason: SURG

## 2021-02-07 RX ORDER — MIDAZOLAM HYDROCHLORIDE 1 MG/ML
INJECTION INTRAMUSCULAR; INTRAVENOUS AS NEEDED
Status: DISCONTINUED | OUTPATIENT
Start: 2021-02-07 | End: 2021-02-07 | Stop reason: SURG

## 2021-02-07 RX ADMIN — METOCLOPRAMIDE HYDROCHLORIDE 10 MG: 5 INJECTION INTRAMUSCULAR; INTRAVENOUS at 10:51:00

## 2021-02-07 RX ADMIN — SODIUM CHLORIDE, SODIUM LACTATE, POTASSIUM CHLORIDE, CALCIUM CHLORIDE: 600; 310; 30; 20 INJECTION, SOLUTION INTRAVENOUS at 10:58:00

## 2021-02-07 RX ADMIN — KETOROLAC TROMETHAMINE 30 MG: 30 INJECTION, SOLUTION INTRAMUSCULAR; INTRAVENOUS at 10:45:00

## 2021-02-07 RX ADMIN — MIDAZOLAM HYDROCHLORIDE 2 MG: 1 INJECTION INTRAMUSCULAR; INTRAVENOUS at 10:27:00

## 2021-02-07 NOTE — ANESTHESIA PREPROCEDURE EVALUATION
PRE-OP EVALUATION    Patient Name: Matthew Covington    Pre-op Diagnosis: Fetal demise before 20 weeks with retention of dead fetus [O02.1]    Procedure(s):  DILATION AND EVACUATION WITH ULTRASOUND GUIDANCE    Surgeon(s) and Role:     Rahul Danielle MD Pulmonary    Negative pulmonary ROS. Neuro/Psych    Negative neuro/psych ROS.                           Fetal demise at 14 weeks      Past Surgical History:   Procedure Laterality Date   • D & C     • DILATION & CURETTAGE SUCTION N/A

## 2021-02-07 NOTE — PROGRESS NOTES
Pt transfer to OR via cart with transport. Called Charge OR RN to give report to OR RN. OR RN to call this nurse back.

## 2021-02-07 NOTE — PROGRESS NOTES
Pt arrived via ambulance, transferred to room 117, pt denies any cramping, small amount of bleeding on pad, pt aprpropriate with situation, very tearful

## 2021-02-07 NOTE — H&P
Gyn H&P    43yo [id number] at 14w1d by dates transferred from [address] with 13wk IUFD for failed IOL now for D&E. She has received 6 doses of cytotec followed by high dose pitocin without delivery.   She then requested to proceed with D&E and was transferred t Fumarate-FA (SM PRENATAL VITAMINS) 28-0.8 MG Oral Tab, Take by mouth., Disp: , Rfl:         No Known Allergies      Family History   Problem Relation Age of Onset   • Thyroid disease Father         hypothyroidism   • Thyroid disease Mother         hypothyr

## 2021-02-07 NOTE — PROGRESS NOTES
Spoke with patient after procedure. Reviewed procedure and findings with patient. Specifically discussed that I was unable to identify a fetus.  Patient reports through her induction process she did pass some large pieces of tissue and large clots, but not

## 2021-02-07 NOTE — ANESTHESIA POSTPROCEDURE EVALUATION
[address] Patient Status:  Inpatient   Age/Gender 38year old female MRN [id number]   [facility] SURGERY Attending Cachorro Lau MD   [address] Day # 1 PCP Devang Liu.  DO Erik       Anesthesia Post-op Note    Procedur

## 2021-02-07 NOTE — PROGRESS NOTES
Discharge instructions discussed with pt, pt rec'd copy of instructions and verb understanding. Share info given to pt for support group info. Momento pin also given. Pt discharged out of unit via w/c in stable condition with PCT.

## 2021-02-07 NOTE — OPERATIVE REPORT
[facility]  Operative Note    Rebecca Echevarria Patient Status:  Inpatient    1985 MRN [id number]   [facility] SURGERY Attending Hamida Kinney MD   [address] Day # 1 PCP Carlos Valencia DO     Preoperative Diagnosis: Fetal demise b

## 2021-02-07 NOTE — PROGRESS NOTES
Pt transferred from PACU via cart to room 117 with transporter. Back to bed without difficulty. Tolerated well, denies pain.  Notified Dr Noé Day of pt arrival.

## 2021-02-07 NOTE — PLAN OF CARE
Not applicable  Problem: BIRTH - VAGINAL/ SECTION  Goal: Fetal and maternal status remain reassuring during the birth process  Description: INTERVENTIONS:  - Monitor vital signs  - Monitor fetal heart rate  - Monitor uterine activity  - Monitor lab

## 2021-02-08 ENCOUNTER — TELEPHONE (OUTPATIENT)
Dept: OBGYN CLINIC | Facility: CLINIC | Age: 36
End: 2021-02-08

## 2021-02-08 ENCOUNTER — LAB ENCOUNTER (OUTPATIENT)
Dept: LAB | Facility: HOSPITAL | Age: 36
End: 2021-02-08
Attending: OBSTETRICS & GYNECOLOGY
Payer: COMMERCIAL

## 2021-02-08 ENCOUNTER — HOSPITAL ENCOUNTER (OUTPATIENT)
Dept: CT IMAGING | Facility: HOSPITAL | Age: 36
Discharge: HOME | End: 2021-02-08
Attending: OBSTETRICS & GYNECOLOGY
Payer: COMMERCIAL

## 2021-02-08 DIAGNOSIS — O03.4 RETAINED PRODUCTS OF CONCEPTION AFTER MISCARRIAGE: Primary | ICD-10-CM

## 2021-02-08 DIAGNOSIS — R10.2 PELVIC PAIN: ICD-10-CM

## 2021-02-08 DIAGNOSIS — O03.4 RETAINED PRODUCTS OF CONCEPTION AFTER MISCARRIAGE: ICD-10-CM

## 2021-02-08 LAB
ANION GAP SERPL CALC-SCNC: 6 MMOL/L (ref 0–18)
B-HCG SERPL-ACNC: 324 MIU/ML
BUN BLD-MCNC: 14 MG/DL (ref 7–18)
BUN/CREAT SERPL: 20.6 (ref 10–20)
CALCIUM BLD-MCNC: 9.1 MG/DL (ref 8.5–10.1)
CHLORIDE SERPL-SCNC: 111 MMOL/L (ref 98–112)
CO2 SERPL-SCNC: 27 MMOL/L (ref 21–32)
CREAT BLD-MCNC: 0.6 MG/DL
CREAT BLD-MCNC: 0.68 MG/DL
GLUCOSE BLD-MCNC: 123 MG/DL (ref 70–99)
OSMOLALITY SERPL CALC.SUM OF ELEC: 300 MOSM/KG (ref 275–295)
PATIENT FASTING Y/N/NP: NO
POTASSIUM SERPL-SCNC: 3.6 MMOL/L (ref 3.5–5.1)
SODIUM SERPL-SCNC: 144 MMOL/L (ref 136–145)

## 2021-02-08 PROCEDURE — 84702 CHORIONIC GONADOTROPIN TEST: CPT

## 2021-02-08 PROCEDURE — 74177 CT ABD & PELVIS W/CONTRAST: CPT | Performed by: OBSTETRICS & GYNECOLOGY

## 2021-02-08 PROCEDURE — 36415 COLL VENOUS BLD VENIPUNCTURE: CPT

## 2021-02-08 PROCEDURE — 80048 BASIC METABOLIC PNL TOTAL CA: CPT

## 2021-02-08 PROCEDURE — 82565 ASSAY OF CREATININE: CPT

## 2021-02-08 NOTE — PAYOR COMM NOTE
--------------  DISCHARGE REVIEW    Payor: Ludwin Vivas [facility] EXCLUSIVE EPO  Subscriber #:  [id number]  Authorization Number: [id number]    Admit date: 2/6/21  Admit time:  1842  Discharge Date: 2/7/2021 12:45 PM     Admitting Physician: Billie Cardoso MD

## 2021-02-08 NOTE — TELEPHONE ENCOUNTER
Called patient to see how she is doing today. Pt states she has scant to no bleeding and mild to no cramping. She is feeling well. Reviewed events of IOL, transfer and attempted D&E with patient.  She states she does not know when she would have passed a fe

## 2021-02-08 NOTE — TELEPHONE ENCOUNTER
Spoke with KCB to confirm that pt needs a stat CT to confirm there are no retained products of conception after D&C attempt at [facility]. Pt. Informed that she will be called with instructions once we have spoken to CT dept.

## 2021-02-08 NOTE — PAYOR COMM NOTE
--------------  ADMISSION REVIEW     Payor: Kehinde Ho [facility] EXCLUSIVE EPO  Subscriber #:  [id number]  Authorization Number: [id number]    Admit date: 2/6/21  Admit time: [address]       Admitting Physician: Simran Martin MD  Attending Physician:  No att.  p chicken pox during childhood       Past Surgical History:   Procedure Laterality Date   • D & C     • DILATION & CURETTAGE SUCTION N/A 4/10/2020    Performed by Jovany Fleming MD at [facility] OR   •   (9 hr. labor);    • OTHER SURGICAL HISTORY testing with reflex to microarray. Patient expressed understanding and is willing to proceed.     Humberto Miller      Electronically signed by Vincenzo Sam MD on 2/7/2021  8:16 AM         Preoperative Diagnosis: Fetal demise before 20 weeks with retention

## 2021-02-08 NOTE — TELEPHONE ENCOUNTER
Talked to [address], [facility], about a Hold and Call for today, for retained fetus and make sure that it passed. CT stated that pt would need a CT for abdomen and pelvis with only. Pt would need to have hcg and BMP today. Ordered placed.   Supervisor talke

## 2021-02-08 NOTE — TELEPHONE ENCOUNTER
Received call from [facility] (lab) for STAT results from BMP and HCG (quant). Message to [facility] (on-call).

## 2021-02-09 ENCOUNTER — TELEPHONE (OUTPATIENT)
Dept: OBGYN UNIT | Facility: HOSPITAL | Age: 36
End: 2021-02-09

## 2021-02-25 ENCOUNTER — NURSE ONLY (OUTPATIENT)
Dept: LAB | Facility: HOSPITAL | Age: 36
End: 2021-02-25
Attending: PREVENTIVE MEDICINE
Payer: COMMERCIAL

## 2021-02-25 ENCOUNTER — TELEPHONE (OUTPATIENT)
Dept: INTERNAL MEDICINE CLINIC | Facility: HOSPITAL | Age: 36
End: 2021-02-25

## 2021-02-25 DIAGNOSIS — Z20.822 SUSPECTED 2019 NOVEL CORONAVIRUS INFECTION: Primary | ICD-10-CM

## 2021-02-25 DIAGNOSIS — Z20.822 SUSPECTED 2019 NOVEL CORONAVIRUS INFECTION: ICD-10-CM

## 2021-02-25 LAB — SARS-COV-2 AG RESP QL IA.RAPID: NOT DETECTED

## 2021-02-25 PROCEDURE — 87426 SARSCOV CORONAVIRUS AG IA: CPT

## 2021-02-25 NOTE — TELEPHONE ENCOUNTER
Department:                                 [] [facility]  []JAMEE   [x] [facility]    Dept Manager/Supervisor/team or clinical lead: Jennifer Salazar    Position:  [] MD     [] RN     [] Respiratory Therapist     [] PCT     [] Other     What shift do you work?     HAVE someone on your unit while not wearing a mask? (e.g., during meal breaks):  Yes []   No []    If yes, who:   Do you share a workspace? Yes []   No []       If yes, with whom? Do you have any family members sick at home?      [] Yes    [] No   If yes, expl

## 2021-02-26 NOTE — TELEPHONE ENCOUNTER
Results and RTW guidelines:    COVID RESULT GIVEN:      Test type:    [x] Rapid         [] Alinity      [x] NEGATIVE     Ordered Alinity retest?  []Yes   [x] No (skip to RTW)       Notes:  Reports she is feeling well today.   No fever since yesterday aracelis

## 2021-03-05 ENCOUNTER — TELEPHONE (OUTPATIENT)
Dept: OBGYN CLINIC | Facility: CLINIC | Age: 36
End: 2021-03-05

## 2021-03-05 ENCOUNTER — OFFICE VISIT (OUTPATIENT)
Dept: OBGYN CLINIC | Facility: CLINIC | Age: 36
End: 2021-03-05
Payer: COMMERCIAL

## 2021-03-05 VITALS — DIASTOLIC BLOOD PRESSURE: 74 MMHG | SYSTOLIC BLOOD PRESSURE: 125 MMHG | HEART RATE: 78 BPM

## 2021-03-05 DIAGNOSIS — N96 HISTORY OF RECURRENT MISCARRIAGES, NOT CURRENTLY PREGNANT: Primary | ICD-10-CM

## 2021-03-05 PROCEDURE — 3074F SYST BP LT 130 MM HG: CPT | Performed by: OBSTETRICS & GYNECOLOGY

## 2021-03-05 PROCEDURE — 3078F DIAST BP <80 MM HG: CPT | Performed by: OBSTETRICS & GYNECOLOGY

## 2021-03-05 NOTE — PROGRESS NOTES
Mason Chavez is a 38year old female [id number] Patient's last menstrual period was 10/31/2020 (exact date). Patient presents with: Follow - Up    Here for PP exam.   Had 13 wk fetal demise with fetal anomalies.   Underwent cytotec and high dose pitocin Medications   Medication Sig Dispense Refill   • Prenatal Vit-Fe Fumarate-FA (SM PRENATAL VITAMINS) 28-0.8 MG Oral Tab Take by mouth.          ALLERGIES:  No Known Allergies      PHYSICAL EXAM:   /74   Pulse 78   LMP 10/31/2020 (Exact Date)   There is

## 2021-03-08 ENCOUNTER — IMMUNIZATION (OUTPATIENT)
Dept: LAB | Facility: HOSPITAL | Age: 36
End: 2021-03-08
Attending: EMERGENCY MEDICINE
Payer: COMMERCIAL

## 2021-03-08 DIAGNOSIS — Z23 NEED FOR VACCINATION: Primary | ICD-10-CM

## 2021-03-08 PROCEDURE — 0011A SARSCOV2 VAC 100MCG/0.5ML IM: CPT

## 2021-03-26 NOTE — PROGRESS NOTES
Outpatient Maternal-Fetal Medicine Consultation    Dear Dr. River Peterson    Thank you for requesting maternal fetal medicine consultation on your patient Otoniel Blount. As you are aware she is a 38year old female with a h/o two miscarriages at 16 weeks.  KELSEY gil # 5 - Date: 04/2020, Sex: None, Weight: None, GA: 12w0d, Delivery: None, Apgar1: None, Apgar5: None, Living: None, Birth Comments: D&C with Dr. Mary Jo Grissom     # 6 - Date: None, Sex: None, Weight: None, GA: None, Delivery: None, Apgar1: None, Apgar5: None, Carmen cases, while rare causes include the dysfibrinogenemias. The total incidence of an inherited thrombophilia in subjects with a deep vein thrombosis ranges from 24 to 37 percent overall compared to about 10 percent in controls.   The lifetime probability of d controls. All late fetal losses in this group of women were associated with absence of folic acid supplementation during pregnancy. Antiphospholipid syndrome(APS) is an autoimmune disorder.   APS is defined as the presence in the serum of at praveena above. The approximate physician face-to-face time was 40 minutes.

## 2021-04-05 ENCOUNTER — IMMUNIZATION (OUTPATIENT)
Dept: LAB | Facility: HOSPITAL | Age: 36
End: 2021-04-05
Attending: PREVENTIVE MEDICINE
Payer: COMMERCIAL

## 2021-04-05 DIAGNOSIS — Z23 NEED FOR VACCINATION: Primary | ICD-10-CM

## 2021-04-05 PROCEDURE — 0012A SARSCOV2 VAC 100MCG/0.5ML IM: CPT

## 2021-04-08 ENCOUNTER — HOSPITAL ENCOUNTER (OUTPATIENT)
Dept: PERINATAL CARE | Facility: HOSPITAL | Age: 36
Discharge: HOME | End: 2021-04-08
Attending: OBSTETRICS & GYNECOLOGY
Payer: COMMERCIAL

## 2021-04-08 VITALS
HEART RATE: 107 BPM | SYSTOLIC BLOOD PRESSURE: 133 MMHG | BODY MASS INDEX: 25 KG/M2 | WEIGHT: 152 LBS | DIASTOLIC BLOOD PRESSURE: 84 MMHG

## 2021-04-08 DIAGNOSIS — N96 HISTORY OF RECURRENT MISCARRIAGES, NOT CURRENTLY PREGNANT: ICD-10-CM

## 2021-04-08 DIAGNOSIS — Z31.69 ENCOUNTER FOR PRECONCEPTION CONSULTATION: ICD-10-CM

## 2021-04-08 DIAGNOSIS — Z82.79 FAMILY HISTORY OF BIRTH DEFECT: ICD-10-CM

## 2021-04-08 PROCEDURE — 99215 OFFICE O/P EST HI 40 MIN: CPT | Performed by: OBSTETRICS & GYNECOLOGY

## 2021-10-04 ENCOUNTER — TELEPHONE (OUTPATIENT)
Dept: PERINATAL CARE | Facility: HOSPITAL | Age: 36
End: 2021-10-04

## 2021-10-04 DIAGNOSIS — N96 HISTORY OF RECURRENT MISCARRIAGES, NOT CURRENTLY PREGNANT: Primary | ICD-10-CM

## 2021-10-04 NOTE — TELEPHONE ENCOUNTER
Received a phone call from [facility] with a question for Dr Damion Cobb. Pt had a pre-pregnancy consultation with Dr Damion Cobb in April and states she is newly pregnant and wanted to know about starting ASA and having APS labs drawn.  Message relayed to Dr Damion Cobb who sp

## 2021-10-05 ENCOUNTER — LAB ENCOUNTER (OUTPATIENT)
Dept: LAB | Facility: HOSPITAL | Age: 36
End: 2021-10-05
Attending: RADIOLOGY
Payer: COMMERCIAL

## 2021-10-05 DIAGNOSIS — O09.291 HISTORY OF PREGNANCY LOSS IN PRIOR PREGNANCY, CURRENTLY PREGNANT IN FIRST TRIMESTER: ICD-10-CM

## 2021-10-05 DIAGNOSIS — N96 HISTORY OF RECURRENT MISCARRIAGES, NOT CURRENTLY PREGNANT: ICD-10-CM

## 2021-10-05 PROCEDURE — 85390 FIBRINOLYSINS SCREEN I&R: CPT

## 2021-10-05 PROCEDURE — 86147 CARDIOLIPIN ANTIBODY EA IG: CPT

## 2021-10-05 PROCEDURE — 85730 THROMBOPLASTIN TIME PARTIAL: CPT

## 2021-10-05 PROCEDURE — 85598 HEXAGNAL PHOSPH PLTLT NEUTRL: CPT

## 2021-10-05 PROCEDURE — 85613 RUSSELL VIPER VENOM DILUTED: CPT

## 2021-10-05 PROCEDURE — 85610 PROTHROMBIN TIME: CPT

## 2021-10-05 PROCEDURE — 36415 COLL VENOUS BLD VENIPUNCTURE: CPT

## 2021-10-05 PROCEDURE — 86146 BETA-2 GLYCOPROTEIN ANTIBODY: CPT

## 2021-10-08 ENCOUNTER — TELEPHONE (OUTPATIENT)
Dept: OBGYN CLINIC | Facility: CLINIC | Age: 36
End: 2021-10-08

## 2021-10-08 ENCOUNTER — LAB ENCOUNTER (OUTPATIENT)
Dept: LAB | Facility: HOSPITAL | Age: 36
End: 2021-10-08
Attending: OBSTETRICS & GYNECOLOGY
Payer: COMMERCIAL

## 2021-10-08 DIAGNOSIS — Z87.59 HISTORY OF MISCARRIAGE: ICD-10-CM

## 2021-10-08 DIAGNOSIS — Z87.59 HISTORY OF MISCARRIAGE: Primary | ICD-10-CM

## 2021-10-08 PROCEDURE — 36415 COLL VENOUS BLD VENIPUNCTURE: CPT

## 2021-10-08 PROCEDURE — 84702 CHORIONIC GONADOTROPIN TEST: CPT

## 2021-10-08 NOTE — TELEPHONE ENCOUNTER
Kelsi Coburn pulled me aside in office today notifying me of +HPT. LMP 9/7/21 and monthly cycles since miscarriage. Had MFM preconception consult recently with Dr Sunshine Taylor. APLAS labs pending. Had wekaly positive anticardiolipin IgM AB in Feb 2021. recs to start bASA, trend HCGs, and early [address] for viability rec'd. Will wait to start lovenox until we know results of [facility] labs. All questions answered.   Msg sent to Dr Sunshine Taylor to notify of +HPT

## 2021-10-14 PROBLEM — D68.61 ANTIPHOSPHOLIPID ANTIBODY SYNDROME (HCC): Status: ACTIVE | Noted: 2021-10-14

## 2021-10-14 NOTE — TELEPHONE ENCOUNTER
Pt will need US at 6-7 weeks to confirm viability. Found to have APLAS and will need Lovenox and bASA once we confirm IUP. She is currently in [facility] and returns soon.

## 2021-10-27 ENCOUNTER — TELEPHONE (OUTPATIENT)
Dept: OBGYN CLINIC | Facility: CLINIC | Age: 36
End: 2021-10-27

## 2021-10-27 ENCOUNTER — HOSPITAL ENCOUNTER (OUTPATIENT)
Dept: ULTRASOUND IMAGING | Age: 36
Discharge: HOME | End: 2021-10-27
Attending: OBSTETRICS & GYNECOLOGY
Payer: COMMERCIAL

## 2021-10-27 DIAGNOSIS — Z87.59 HISTORY OF MISCARRIAGE: Primary | ICD-10-CM

## 2021-10-27 DIAGNOSIS — O36.80X0 ULTRASOUND SCAN TO CONFIRM FETAL VIABILITY WITH HISTORY OF MISCARRIAGE: ICD-10-CM

## 2021-10-27 DIAGNOSIS — Z87.59 ULTRASOUND SCAN TO CONFIRM FETAL VIABILITY WITH HISTORY OF MISCARRIAGE: ICD-10-CM

## 2021-10-27 PROCEDURE — 76817 TRANSVAGINAL US OBSTETRIC: CPT | Performed by: OBSTETRICS & GYNECOLOGY

## 2021-10-27 PROCEDURE — 76801 OB US < 14 WKS SINGLE FETUS: CPT | Performed by: OBSTETRICS & GYNECOLOGY

## 2021-10-27 NOTE — TELEPHONE ENCOUNTER
On call     Received page from radiology with [address] results. 6w1d w/o fht.  lMP 9/7 making pt 7w1d. CRL not c/w with miscarriage yet. Plan for serial hcg's then likely US in 7-10d. Pt taking bASA as previously rec'd. All questions answered.

## 2021-10-28 ENCOUNTER — LAB ENCOUNTER (OUTPATIENT)
Dept: LAB | Facility: HOSPITAL | Age: 36
End: 2021-10-28
Attending: OBSTETRICS & GYNECOLOGY
Payer: COMMERCIAL

## 2021-10-28 DIAGNOSIS — Z87.59 HISTORY OF MISCARRIAGE: ICD-10-CM

## 2021-10-28 PROCEDURE — 84702 CHORIONIC GONADOTROPIN TEST: CPT

## 2021-10-28 PROCEDURE — 36415 COLL VENOUS BLD VENIPUNCTURE: CPT

## 2021-10-30 ENCOUNTER — LAB ENCOUNTER (OUTPATIENT)
Dept: LAB | Facility: HOSPITAL | Age: 36
End: 2021-10-30
Attending: OBSTETRICS & GYNECOLOGY
Payer: COMMERCIAL

## 2021-10-30 DIAGNOSIS — Z87.59 HISTORY OF MISCARRIAGE: ICD-10-CM

## 2021-10-30 PROCEDURE — 84702 CHORIONIC GONADOTROPIN TEST: CPT

## 2021-10-30 PROCEDURE — 36415 COLL VENOUS BLD VENIPUNCTURE: CPT

## 2021-11-03 ENCOUNTER — TELEPHONE (OUTPATIENT)
Dept: OBGYN CLINIC | Facility: CLINIC | Age: 36
End: 2021-11-03

## 2021-11-03 DIAGNOSIS — Z87.59 ULTRASOUND SCAN TO CONFIRM FETAL VIABILITY WITH HISTORY OF MISCARRIAGE: Primary | ICD-10-CM

## 2021-11-03 DIAGNOSIS — O36.80X0 ULTRASOUND SCAN TO CONFIRM FETAL VIABILITY WITH HISTORY OF MISCARRIAGE: Primary | ICD-10-CM

## 2021-11-03 NOTE — TELEPHONE ENCOUNTER
Pts last US was 10/27. Pt will need US done tomorrow or Friday. Called US and spoke with Daisy. Daisy stated that they can get pt in for US tomorrow, 11/4, at 4:30pm at [facility] main. Pt informed of US date and time.  Pt given parking and full bladder instr

## 2021-11-03 NOTE — TELEPHONE ENCOUNTER
----- Message from Jacob Avila DO sent at 11/2/2021  8:54 AM CDT -----  Hcg hovering.   Pt to get US 7-10 days from last

## 2021-11-04 ENCOUNTER — HOSPITAL ENCOUNTER (OUTPATIENT)
Dept: ULTRASOUND IMAGING | Facility: HOSPITAL | Age: 36
Discharge: HOME | End: 2021-11-04
Attending: OBSTETRICS & GYNECOLOGY
Payer: COMMERCIAL

## 2021-11-04 DIAGNOSIS — O36.80X0 ULTRASOUND SCAN TO CONFIRM FETAL VIABILITY WITH HISTORY OF MISCARRIAGE: ICD-10-CM

## 2021-11-04 DIAGNOSIS — Z87.59 ULTRASOUND SCAN TO CONFIRM FETAL VIABILITY WITH HISTORY OF MISCARRIAGE: ICD-10-CM

## 2021-11-04 PROCEDURE — 76801 OB US < 14 WKS SINGLE FETUS: CPT | Performed by: OBSTETRICS & GYNECOLOGY

## 2021-11-04 PROCEDURE — 76817 TRANSVAGINAL US OBSTETRIC: CPT | Performed by: OBSTETRICS & GYNECOLOGY

## 2021-11-05 ENCOUNTER — TELEPHONE (OUTPATIENT)
Dept: OBGYN CLINIC | Facility: CLINIC | Age: 36
End: 2021-11-05

## 2021-11-05 ENCOUNTER — LAB ENCOUNTER (OUTPATIENT)
Dept: LAB | Facility: HOSPITAL | Age: 36
End: 2021-11-05
Attending: OBSTETRICS & GYNECOLOGY
Payer: COMMERCIAL

## 2021-11-05 DIAGNOSIS — O02.1 MISSED ABORTION: Primary | ICD-10-CM

## 2021-11-05 DIAGNOSIS — Z01.818 PREOP TESTING: ICD-10-CM

## 2021-11-05 NOTE — TELEPHONE ENCOUNTER
Spoke to Lemuel Marks will contact spouse in regards of dates Sat,11/6/21 at 1230pm with MINDI or Mon,11/08/21 at 11:30am with MINDI.

## 2021-11-05 NOTE — TELEPHONE ENCOUNTER
Spoke to pt. Via wedex.  Aware surgery is scheduled on Monday,11/08/2021 at 11:30am with MINDI    Surgical order initiated as urgent gets sent to [employer] automatically for approval. Will route to [employer] as high priority     Minor case instructions se

## 2021-11-05 NOTE — TELEPHONE ENCOUNTER
Called by [address] last night with US report. Ally Becker is an RN in our office and I spoke with her regarding the report. C/w 6 week fetal demise. Pt is planning to go to [facility] for nursing conference on Wednesday. Would like Suction D&C ASAP.   [facility], can we

## 2021-11-08 PROBLEM — O02.1 MISSED ABORTION: Status: ACTIVE | Noted: 2021-11-08

## 2021-11-08 PROBLEM — O02.1 MISSED ABORTION (HCC): Status: ACTIVE | Noted: 2021-11-08

## 2021-11-08 PROBLEM — O02.1 FETAL DEMISE BEFORE 20 WEEKS WITH RETENTION OF DEAD FETUS: Status: RESOLVED | Noted: 2021-02-06 | Resolved: 2021-11-08

## 2021-11-08 PROBLEM — O02.1 FETAL DEMISE BEFORE 20 WEEKS WITH RETENTION OF DEAD FETUS (HCC): Status: RESOLVED | Noted: 2021-02-06 | Resolved: 2021-11-08

## 2021-11-08 PROBLEM — Z34.90 PREGNANCY (HCC): Status: RESOLVED | Noted: 2021-02-05 | Resolved: 2021-11-08

## 2021-11-08 PROBLEM — Z34.90 PREGNANCY: Status: RESOLVED | Noted: 2021-02-05 | Resolved: 2021-11-08

## 2021-12-21 ENCOUNTER — NURSE ONLY (OUTPATIENT)
Dept: LAB | Facility: HOSPITAL | Age: 36
End: 2021-12-21
Attending: PREVENTIVE MEDICINE
Payer: COMMERCIAL

## 2021-12-21 ENCOUNTER — TELEPHONE (OUTPATIENT)
Dept: INTERNAL MEDICINE CLINIC | Facility: HOSPITAL | Age: 36
End: 2021-12-21

## 2021-12-21 DIAGNOSIS — Z20.822 SUSPECTED COVID-19 VIRUS INFECTION: Primary | ICD-10-CM

## 2021-12-21 DIAGNOSIS — Z20.822 SUSPECTED COVID-19 VIRUS INFECTION: ICD-10-CM

## 2021-12-21 LAB — SARS-COV-2 RNA RESP QL NAA+PROBE: DETECTED

## 2021-12-21 NOTE — TELEPHONE ENCOUNTER
Results and RTW guidelines:    COVID RESULT:    [] Viewed by employee in [address] RTW plan and instructions as indicated on triage call. Manager notified. Estimated RTW date:   [x] Discussed with employee   [] Unable to reach by phone.   Sent via [employer] intubation for COVID    5.  Still has a fever, vomiting or diarrhea   - Keep communication open with management about RTW and if symptoms worsen                - If outside testing completed, bring a copy of result to RTW appointment           Notes: Emp aw

## 2021-12-21 NOTE — TELEPHONE ENCOUNTER
Department: [facility]                                 [] [address] [facility]  []JAMEE   [x] [facility]    Dept Manager/Supervisor/team or clinical lead: Almita Graham    Position:  [] MD     [x] RN     [] Respiratory Therapist     [] PCT     [] PSR      [] UIP Days  When was the last shift you worked?  12/20/2021  When are you next scheduled to work?12/21/2021 - called off    Did you have close contact with someone on your unit while not wearing a mask? (e.g., during meal breaks):  Yes [x]   No []    If yes, who: next 14 days. Test w/ Alinity 3-5 days after exposure. NOTES: Experiencing symptoms so a rapid was ordered, if rapid is negative will need an Alinity ordered due to exposure at home.

## 2022-02-15 ENCOUNTER — OFFICE VISIT (OUTPATIENT)
Dept: FAMILY MEDICINE CLINIC | Facility: CLINIC | Age: 37
End: 2022-02-15
Payer: COMMERCIAL

## 2022-02-15 VITALS
HEART RATE: 84 BPM | DIASTOLIC BLOOD PRESSURE: 82 MMHG | HEIGHT: 65 IN | BODY MASS INDEX: 27.47 KG/M2 | WEIGHT: 164.88 LBS | SYSTOLIC BLOOD PRESSURE: 122 MMHG

## 2022-02-15 DIAGNOSIS — R42 DIZZY: Primary | ICD-10-CM

## 2022-02-15 PROCEDURE — 3079F DIAST BP 80-89 MM HG: CPT | Performed by: FAMILY MEDICINE

## 2022-02-15 PROCEDURE — 99213 OFFICE O/P EST LOW 20 MIN: CPT | Performed by: FAMILY MEDICINE

## 2022-02-15 PROCEDURE — 3074F SYST BP LT 130 MM HG: CPT | Performed by: FAMILY MEDICINE

## 2022-02-15 PROCEDURE — 3008F BODY MASS INDEX DOCD: CPT | Performed by: FAMILY MEDICINE

## 2022-02-15 RX ORDER — SERTRALINE HYDROCHLORIDE 25 MG/1
TABLET, FILM COATED ORAL
Qty: 60 TABLET | Refills: 2 | Status: SHIPPED | OUTPATIENT
Start: 2022-02-15 | End: 2022-04-04

## 2022-02-15 RX ORDER — SERTRALINE HYDROCHLORIDE 25 MG/1
TABLET, FILM COATED ORAL
Qty: 30 TABLET | Refills: 2 | Status: SHIPPED | OUTPATIENT
Start: 2022-02-15 | End: 2022-02-15

## 2022-02-15 NOTE — PROGRESS NOTES
Blood pressure 122/82, pulse 84, height 5' 5\" (1.651 m), weight 164 lb 14.4 oz (74.8 kg), last menstrual period 09/07/2021, unknown if currently breastfeeding. Presents today complaining of intermittent panic attacks. She reports that she has had 2 episodes of getting sweaty palms feeling dizzy. She denies pregnancy. She does not have heavy periods. She is currently not pregnant and she knows this because she just got off her period. She may become pregnant in the future. No suicidal ideation. No audiovisual hallucinations. Denies overspending. Sleeps well at night. No crying spells. 3 children at home works full-time  works nights.     Objective patient with appropriate mood and affect    Assessment anxiety with panic    Plan sertraline 25 mg daily for 1 week then 50 mg daily    Follow-up in 3 weeks

## 2022-03-25 ENCOUNTER — LAB ENCOUNTER (OUTPATIENT)
Dept: LAB | Facility: HOSPITAL | Age: 37
End: 2022-03-25
Attending: FAMILY MEDICINE
Payer: COMMERCIAL

## 2022-03-25 DIAGNOSIS — R42 DIZZY: ICD-10-CM

## 2022-03-25 LAB
BASOPHILS # BLD AUTO: 0.07 X10(3) UL (ref 0–0.2)
BASOPHILS NFR BLD AUTO: 0.8 %
DEPRECATED RDW RBC AUTO: 43.8 FL (ref 35.1–46.3)
EOSINOPHIL # BLD AUTO: 0.59 X10(3) UL (ref 0–0.7)
EOSINOPHIL NFR BLD AUTO: 7.1 %
ERYTHROCYTE [DISTWIDTH] IN BLOOD BY AUTOMATED COUNT: 12.9 % (ref 11–15)
HCT VFR BLD AUTO: 42.8 %
HGB BLD-MCNC: 14.1 G/DL
IMM GRANULOCYTES # BLD AUTO: 0.03 X10(3) UL (ref 0–1)
IMM GRANULOCYTES NFR BLD: 0.4 %
LYMPHOCYTES # BLD AUTO: 2.29 X10(3) UL (ref 1–4)
LYMPHOCYTES NFR BLD AUTO: 27.5 %
MCH RBC QN AUTO: 30.3 PG (ref 26–34)
MCHC RBC AUTO-ENTMCNC: 32.9 G/DL (ref 31–37)
MCV RBC AUTO: 92 FL
MONOCYTES # BLD AUTO: 0.6 X10(3) UL (ref 0.1–1)
MONOCYTES NFR BLD AUTO: 7.2 %
NEUTROPHILS # BLD AUTO: 4.76 X10 (3) UL (ref 1.5–7.7)
NEUTROPHILS # BLD AUTO: 4.76 X10(3) UL (ref 1.5–7.7)
NEUTROPHILS NFR BLD AUTO: 57 %
PLATELET # BLD AUTO: 333 10(3)UL (ref 150–450)
RBC # BLD AUTO: 4.65 X10(6)UL
TSI SER-ACNC: 1.29 MIU/ML (ref 0.36–3.74)
WBC # BLD AUTO: 8.3 X10(3) UL (ref 4–11)

## 2022-03-25 PROCEDURE — 36415 COLL VENOUS BLD VENIPUNCTURE: CPT

## 2022-03-25 PROCEDURE — 85025 COMPLETE CBC W/AUTO DIFF WBC: CPT

## 2022-03-25 PROCEDURE — 84443 ASSAY THYROID STIM HORMONE: CPT

## 2022-04-04 NOTE — PROGRESS NOTES
Video visit    Patient is following up for work related stress with anxiety. Reports she feels much better on the sertraline. She did not take it while she was on vacation last week. She reports she feels much better and does not feel the need to increase the dose of the medication. She otherwise feels fine.     Objective appropriate mood and affect    Assessment work-related stress and anxiety    Plan continue present medication follow-up 1 year

## 2022-04-28 ENCOUNTER — TELEPHONE (OUTPATIENT)
Dept: INTERNAL MEDICINE CLINIC | Facility: HOSPITAL | Age: 37
End: 2022-04-28

## 2022-04-28 ENCOUNTER — LAB ENCOUNTER (OUTPATIENT)
Dept: LAB | Facility: HOSPITAL | Age: 37
End: 2022-04-28
Attending: PREVENTIVE MEDICINE
Payer: COMMERCIAL

## 2022-04-28 DIAGNOSIS — Z20.822 SUSPECTED COVID-19 VIRUS INFECTION: ICD-10-CM

## 2022-04-28 LAB — SARS-COV-2 RNA RESP QL NAA+PROBE: NOT DETECTED

## 2022-10-06 NOTE — TELEPHONE ENCOUNTER
Last seen 4/4, refilled per protocol, patient advised to follow up in 1 year      Refill passed per [facility] protocol.    Requested Prescriptions   Pending Prescriptions Disp Refills    sertraline 50 MG Oral Tab 90 tablet 3     Sig: TAKE 1 TAB DAILY        Psychiatric Non-Scheduled (Anti-Anxiety) Failed - 10/6/2022  8:51 AM        Failed - In person appointment or virtual visit in the past 6 mos or appointment in next 3 mos       Recent Outpatient Visits              6 months ago Stress reaction    [facility] Jamin Gonzalez DO    Telemedicine    7 months ago [address], Jamin Gonzalez DO    Office Visit    9 months ago Suspected COVID-19 virus infection    [facility]    Nurse Only    1 year ago Postpartum care and examination    [facility], Claudia Godfrey MD    Office Visit    1 year ago Suspected 2019 novel coronavirus infection    [facility]    Nurse Only                      Recent Outpatient Visits              6 months ago Stress reaction    [address] Rashi Henriquez DO    Telemedicine    7 months ago [address], Jamin Gonzalez DO    Office Visit    9 months ago Suspected COVID-19 virus infection    [facility]    Nurse Only    1 year ago Postpartum care and examination    [facility]    Office Visit    1 year ago Suspected 2019 novel coronavirus infection    [facility]    Nurse Only

## 2022-10-19 ENCOUNTER — IMMUNIZATION (OUTPATIENT)
Dept: LAB | Facility: HOSPITAL | Age: 37
End: 2022-10-19

## 2022-10-19 DIAGNOSIS — Z23 NEED FOR VACCINATION: Primary | ICD-10-CM

## 2022-10-19 PROCEDURE — 90471 IMMUNIZATION ADMIN: CPT

## 2023-04-27 ENCOUNTER — TELEPHONE (OUTPATIENT)
Dept: FAMILY MEDICINE CLINIC | Facility: CLINIC | Age: 38
End: 2023-04-27

## 2023-05-04 ENCOUNTER — TELEPHONE (OUTPATIENT)
Dept: FAMILY MEDICINE CLINIC | Facility: CLINIC | Age: 38
End: 2023-05-04

## 2023-05-04 ENCOUNTER — MED REC SCAN ONLY (OUTPATIENT)
Dept: FAMILY MEDICINE CLINIC | Facility: CLINIC | Age: 38
End: 2023-05-04

## 2023-05-09 ENCOUNTER — TELEPHONE (OUTPATIENT)
Dept: PEDIATRICS CLINIC | Facility: CLINIC | Age: 38
End: 2023-05-09

## 2023-05-09 ENCOUNTER — NURSE ONLY (OUTPATIENT)
Dept: PEDIATRICS CLINIC | Facility: CLINIC | Age: 38
End: 2023-05-09

## 2023-05-09 DIAGNOSIS — Z11.1 PPD SCREENING TEST: Primary | ICD-10-CM

## 2023-05-09 PROCEDURE — 86580 TB INTRADERMAL TEST: CPT | Performed by: NURSE PRACTITIONER

## 2023-05-09 NOTE — PROGRESS NOTES
Pt here as a nurse visit for TB test   Vaccine due; TB Testing    Right forearm     Vaccine given without incident.

## 2023-05-12 ENCOUNTER — NURSE ONLY (OUTPATIENT)
Dept: PEDIATRICS CLINIC | Facility: CLINIC | Age: 38
End: 2023-05-12

## 2023-05-12 DIAGNOSIS — Z11.1 ENCOUNTER FOR PPD SKIN TEST READING: Primary | ICD-10-CM

## 2023-05-12 LAB — INDURATION (): 0 MM (ref 0–11)

## 2023-05-21 ENCOUNTER — HOSPITAL ENCOUNTER (OUTPATIENT)
Age: 38
Discharge: HOME | End: 2023-05-21
Payer: COMMERCIAL

## 2023-05-21 VITALS
DIASTOLIC BLOOD PRESSURE: 66 MMHG | RESPIRATION RATE: 20 BRPM | OXYGEN SATURATION: 100 % | SYSTOLIC BLOOD PRESSURE: 131 MMHG | TEMPERATURE: 98 F | HEART RATE: 88 BPM

## 2023-05-21 DIAGNOSIS — L03.019 ONYCHIA AND PARONYCHIA OF FINGER: Primary | ICD-10-CM

## 2023-05-21 PROCEDURE — 99203 OFFICE O/P NEW LOW 30 MIN: CPT | Performed by: NURSE PRACTITIONER

## 2023-05-21 RX ORDER — CEPHALEXIN 500 MG/1
500 CAPSULE ORAL 3 TIMES DAILY
Qty: 30 CAPSULE | Refills: 0 | Status: SHIPPED | OUTPATIENT
Start: 2023-05-21 | End: 2023-05-21

## 2023-05-21 RX ORDER — CEPHALEXIN 500 MG/1
500 CAPSULE ORAL 3 TIMES DAILY
Qty: 30 CAPSULE | Refills: 0 | Status: SHIPPED | OUTPATIENT
Start: 2023-05-21 | End: 2023-05-31

## 2023-08-07 NOTE — PROGRESS NOTES
Outpatient Maternal-Fetal Medicine Consultation    Dear Dr. Lenora Schaffer,    Thank you for requesting ultrasound evaluation and maternal fetal medicine consultation on your patient Angus Carty.   As you are aware she is a 38year old female with a Juares p is unknown. She indicated that the status of her maternal grandfather is unknown. She indicated that the status of her maternal uncle is unknown.       Medications:   Current Outpatient Medications:   •  PRENATAL MULTI +DHA 27-0.8-228 MG Oral Cap, Take 1 ca maternal age. The fetus measures consistent with 12 weeks 5 days therefore I would advise a D&C rather than waiting spontaneous miscarriage or medical management. Her D&C also gives the advantages of being able to send tissue for genetic testing.     He no

## 2023-11-02 ENCOUNTER — APPOINTMENT (OUTPATIENT)
Dept: GENERAL RADIOLOGY | Age: 38
End: 2023-11-02
Attending: NURSE PRACTITIONER
Payer: COMMERCIAL

## 2023-11-02 ENCOUNTER — HOSPITAL ENCOUNTER (OUTPATIENT)
Age: 38
Discharge: HOME | End: 2023-11-02
Payer: COMMERCIAL

## 2023-11-02 VITALS
SYSTOLIC BLOOD PRESSURE: 141 MMHG | TEMPERATURE: 98 F | DIASTOLIC BLOOD PRESSURE: 89 MMHG | HEART RATE: 82 BPM | OXYGEN SATURATION: 100 % | RESPIRATION RATE: 18 BRPM

## 2023-11-02 DIAGNOSIS — J98.8 VIRAL RESPIRATORY ILLNESS: Primary | ICD-10-CM

## 2023-11-02 DIAGNOSIS — B97.89 VIRAL RESPIRATORY ILLNESS: Primary | ICD-10-CM

## 2023-11-02 DIAGNOSIS — J98.01 BRONCHOSPASM: ICD-10-CM

## 2023-11-02 LAB — SARS-COV-2 RNA RESP QL NAA+PROBE: NOT DETECTED

## 2023-11-02 PROCEDURE — 94640 AIRWAY INHALATION TREATMENT: CPT | Performed by: NURSE PRACTITIONER

## 2023-11-02 PROCEDURE — 99213 OFFICE O/P EST LOW 20 MIN: CPT | Performed by: NURSE PRACTITIONER

## 2023-11-02 PROCEDURE — 71046 X-RAY EXAM CHEST 2 VIEWS: CPT | Performed by: NURSE PRACTITIONER

## 2023-11-02 PROCEDURE — U0002 COVID-19 LAB TEST NON-CDC: HCPCS | Performed by: NURSE PRACTITIONER

## 2023-11-02 RX ORDER — IPRATROPIUM BROMIDE AND ALBUTEROL SULFATE 2.5; .5 MG/3ML; MG/3ML
3 SOLUTION RESPIRATORY (INHALATION) ONCE
Status: COMPLETED | OUTPATIENT
Start: 2023-11-02 | End: 2023-11-02

## 2023-11-02 RX ORDER — BENZONATATE 100 MG/1
100 CAPSULE ORAL 3 TIMES DAILY PRN
Qty: 20 CAPSULE | Refills: 0 | Status: SHIPPED | OUTPATIENT
Start: 2023-11-02 | End: 2023-11-09

## 2023-11-02 RX ORDER — PREDNISONE 20 MG/1
40 TABLET ORAL ONCE
Status: COMPLETED | OUTPATIENT
Start: 2023-11-02 | End: 2023-11-02

## 2023-11-02 RX ORDER — PREDNISONE 20 MG/1
40 TABLET ORAL DAILY
Qty: 8 TABLET | Refills: 0 | Status: SHIPPED | OUTPATIENT
Start: 2023-11-02 | End: 2023-11-06

## 2023-11-02 RX ORDER — ALBUTEROL SULFATE 90 UG/1
2 AEROSOL, METERED RESPIRATORY (INHALATION) EVERY 4 HOURS PRN
Qty: 1 EACH | Refills: 0 | Status: SHIPPED | OUTPATIENT
Start: 2023-11-02 | End: 2023-12-02

## 2023-11-02 NOTE — DISCHARGE INSTRUCTIONS
You may use the cough suppressant as directed however be aware this medication can cause drowsiness. Use the inhaler every 4 hours as needed for spastic coughing, difficulty breathing or wheezing. Take the steroid daily as directed however do not begin this until tomorrow as you had your first dose here in the immediate care today. Make sure to stay well hydrated with clear fluids. You can use Tylenol or Motrin every 6 hours to control fever or discomfort. You can use both Tylenol and Motrin, but alternate them so that you're getting one every 3 hours. Warm salt water gargles, throat lozenges, and warmed beverages can be additionally helpful for a sore throat. Using a humidifier in the bedroom at night, and sleeping propped up on pillows/somewhat of an incline can also be helpful. Cover your cough and wash your hands frequently to prevent the spread of infection. Follow up with your primary care provider in the next 2-3 days. Seek additional care in the ER for fever that is not controlled with Tylenol and Motrin, difficulty breathing or shortness of breath, chest pain, or any new/worsening symptoms.

## 2023-11-02 NOTE — ED INITIAL ASSESSMENT (HPI)
R sided chest pain developed 3 days ago. Notices it w/ cough, regular breathing along with a wheeze. Reports having an evolving cough over the past 2 months.

## 2023-11-17 ENCOUNTER — OFFICE VISIT (OUTPATIENT)
Dept: OBGYN CLINIC | Facility: CLINIC | Age: 38
End: 2023-11-17

## 2023-11-17 VITALS
WEIGHT: 181 LBS | HEART RATE: 73 BPM | BODY MASS INDEX: 30 KG/M2 | SYSTOLIC BLOOD PRESSURE: 136 MMHG | DIASTOLIC BLOOD PRESSURE: 86 MMHG

## 2023-11-17 DIAGNOSIS — N64.4 BREAST PAIN: Primary | ICD-10-CM

## 2023-11-17 PROCEDURE — 3075F SYST BP GE 130 - 139MM HG: CPT | Performed by: OBSTETRICS & GYNECOLOGY

## 2023-11-17 PROCEDURE — 99213 OFFICE O/P EST LOW 20 MIN: CPT | Performed by: OBSTETRICS & GYNECOLOGY

## 2023-11-17 PROCEDURE — 3079F DIAST BP 80-89 MM HG: CPT | Performed by: OBSTETRICS & GYNECOLOGY

## 2023-12-19 ENCOUNTER — TELEPHONE (OUTPATIENT)
Dept: PEDIATRICS CLINIC | Facility: CLINIC | Age: 38
End: 2023-12-19

## 2023-12-19 RX ORDER — AZITHROMYCIN 250 MG/1
TABLET, FILM COATED ORAL
Qty: 6 TABLET | Refills: 0 | Status: SHIPPED | OUTPATIENT
Start: 2023-12-19 | End: 2023-12-24

## 2024-02-04 ENCOUNTER — TELEPHONE (OUTPATIENT)
Dept: FAMILY MEDICINE CLINIC | Facility: CLINIC | Age: 39
End: 2024-02-04

## 2024-02-06 NOTE — TELEPHONE ENCOUNTER
Please review; protocol failed/No Protocol appointment only     LOV: telemedicine 04/04/2022  Requested Prescriptions   Pending Prescriptions Disp Refills    SERTRALINE 50 MG Oral Tab [Pharmacy Med Name: SERTRALINE 50MG TABLETS] 90 tablet 1     Sig: TAKE 1 TABLET BY MOUTH DAILY       Psychiatric Non-Scheduled (Anti-Anxiety) Failed - 2/6/2024 11:29 AM        Failed - In person appointment or virtual visit in the past 6 mos or appointment in next 3 mos     Recent Outpatient Visits              2 months ago Breast pain    [facility] Janett Galvan MD    Office Visit    9 months ago Encounter for PPD skin test reading    [facility]    Nurse Only    9 months ago PPD screening test    [facility]    Nurse Only    1 year ago Stress reaction    [place] [place] Ces[place], David B,     Telemedicine    1 year ago Dizzy    [place] [place] [facility], David B, DO    Office Visit                      Passed - Depression Screening completed within the past 12 months             Recent Outpatient Visits              2 months ago Breast pain    [facility] Jnaett Galvan MD    Office Visit    9 months ago Encounter for PPD skin test reading    [facility]    Nurse Only    9 months ago PPD screening test    [facility]    Nurse Only    1 year ago Stress reaction    [place] [place] [facility], David B, DO    Telemedicine    1 year ago Arie    [place] [place] [facility], David B, DO    Office Visit

## 2025-06-30 ENCOUNTER — OFFICE VISIT (OUTPATIENT)
Dept: FAMILY MEDICINE CLINIC | Facility: CLINIC | Age: 40
End: 2025-06-30
Payer: COMMERCIAL

## 2025-06-30 VITALS
TEMPERATURE: 98 F | SYSTOLIC BLOOD PRESSURE: 116 MMHG | HEART RATE: 74 BPM | WEIGHT: 187 LBS | DIASTOLIC BLOOD PRESSURE: 78 MMHG | HEIGHT: 66 IN | BODY MASS INDEX: 30.05 KG/M2

## 2025-06-30 DIAGNOSIS — Z00.00 WELL ADULT EXAM: Primary | ICD-10-CM

## 2025-06-30 DIAGNOSIS — Z12.31 ENCOUNTER FOR SCREENING MAMMOGRAM FOR BREAST CANCER: ICD-10-CM

## 2025-06-30 DIAGNOSIS — E66.3 OVERWEIGHT (BMI 25.0-29.9): ICD-10-CM

## 2025-06-30 DIAGNOSIS — F41.1 GENERALIZED ANXIETY DISORDER: ICD-10-CM

## 2025-06-30 DIAGNOSIS — D68.61 ANTIPHOSPHOLIPID ANTIBODY SYNDROME (HCC): ICD-10-CM

## 2025-06-30 DIAGNOSIS — N96 HISTORY OF RECURRENT MISCARRIAGES: ICD-10-CM

## 2025-06-30 PROBLEM — O09.529 AMA (ADVANCED MATERNAL AGE) MULTIGRAVIDA 35+ (HCC): Status: RESOLVED | Noted: 2021-01-06 | Resolved: 2025-06-30

## 2025-06-30 PROBLEM — O02.1 MISSED ABORTION (HCC): Status: RESOLVED | Noted: 2021-11-08 | Resolved: 2025-06-30

## 2025-06-30 PROCEDURE — 99386 PREV VISIT NEW AGE 40-64: CPT | Performed by: FAMILY MEDICINE

## 2025-06-30 RX ORDER — SERTRALINE HYDROCHLORIDE 25 MG/1
25 TABLET, FILM COATED ORAL DAILY
Qty: 90 TABLET | Refills: 0 | Status: SHIPPED | OUTPATIENT
Start: 2025-06-30

## 2025-06-30 NOTE — PROGRESS NOTES
HPI:    Patient ID: Sheron Harris is a 40 year old female.   Chief Complaint   Patient presents with    Routine Physical     Sees gyne       History of Present Illness       HPI    Wt Readings from Last 6 Encounters:   06/30/25 187 lb (84.8 kg)   11/17/23 181 lb (82.1 kg)   02/15/22 164 lb 14.4 oz (74.8 kg)   04/08/21 152 lb (68.9 kg)   02/01/21 162 lb (73.5 kg)   01/06/21 161 lb (73 kg)     BP Readings from Last 3 Encounters:   06/30/25 116/78   11/17/23 136/86   11/02/23 141/89     New patient to me     He of antiphospholipid syndrome.  3 kids.  Hx multiple miscarriages   Non smoker  No vaping    Hx of anxiety  Has had GI issues, takes probiotics    Review of Systems   Constitutional:  Negative for activity change, appetite change, chills, fatigue, fever and unexpected weight change.   HENT:  Negative for congestion, dental problem, drooling, ear discharge, ear pain, facial swelling, hearing loss, mouth sores, nosebleeds, postnasal drip, rhinorrhea, sinus pressure, sinus pain, sneezing, sore throat, tinnitus, trouble swallowing and voice change.    Eyes:  Negative for pain, discharge, redness and visual disturbance.   Respiratory:  Negative for cough, shortness of breath and wheezing.    Cardiovascular:  Negative for chest pain, palpitations and leg swelling.   Gastrointestinal:  Negative for abdominal pain, anal bleeding, blood in stool, constipation, diarrhea, nausea, rectal pain and vomiting.   Endocrine: Negative for cold intolerance, heat intolerance, polydipsia, polyphagia and polyuria.   Genitourinary:  Negative for decreased urine volume, difficulty urinating, dysuria, flank pain, frequency, menstrual problem, pelvic pain, urgency, vaginal bleeding, vaginal discharge and vaginal pain.        Periods are regular     Musculoskeletal:  Negative for arthralgias, back pain and myalgias.   Skin:  Negative for rash.   Neurological:  Negative for dizziness, seizures, syncope, weakness, numbness and headaches.    Hematological:  Does not bruise/bleed easily.   Psychiatric/Behavioral:  Negative for behavioral problems, decreased concentration, self-injury, sleep disturbance and suicidal ideas. The patient is not nervous/anxious.        /78   Pulse 74   Temp 97.8 °F (36.6 °C) (Temporal)   Ht 5' 6\" (1.676 m)   Wt 187 lb (84.8 kg)   LMP 06/16/2025 (Approximate)   Breastfeeding No   BMI 30.18 kg/m²     Past Medical History[1]  Past Surgical History[2]  Social History     Socioeconomic History    Marital status:      Spouse name: Not on file    Number of children: Not on file    Years of education: Not on file    Highest education level: Not on file   Occupational History    Not on file   Tobacco Use    Smoking status: Former     Passive exposure: Never    Smokeless tobacco: Never    Tobacco comments:     Socially in [place] - Not very much.    Vaping Use    Vaping status: Never Used   Substance and Sexual Activity    Alcohol use: Not Currently    Drug use: No    Sexual activity: Not on file   Other Topics Concern     Service Not Asked    Blood Transfusions Not Asked    Caffeine Concern Yes     Comment: tea, occa.     Occupational Exposure Not Asked    Hobby Hazards Not Asked    Sleep Concern Not Asked    Stress Concern Not Asked    Weight Concern Not Asked    Special Diet Not Asked    Back Care Not Asked    Exercise Not Asked    Bike Helmet Not Asked    Seat Belt Not Asked    Self-Exams Not Asked   Social History Narrative    Not on file     Social Drivers of Health     Food Insecurity: Not on file   Transportation Needs: Not on file   Stress: Not on file   Housing Stability: Not on file     Family History[3]    Immunization History   Administered Date(s) Administered    Covid-19 Vaccine Moderna 100 mcg/0.5 ml 03/08/2021, 04/05/2021    DTAP 06/19/1984, 08/22/1984, 10/22/1984, 11/08/1985, 08/22/1989    FLULAVAL 6 months & older 0.5 ml Prefilled syringe (34619) 10/19/2022    Flulaval, 3 Years & >, IM  09/25/2019, 10/19/2020    HEP B 06/02/2008, 08/11/2008, 02/21/2011    HIB 04/16/1986    IPV 06/19/1984, 08/22/1984, 11/08/1985, 08/22/1989    Influenza 10/18/2016, 10/04/2017, 09/25/2018, 09/11/2021    MMR 07/31/1985, 08/12/1993    TD 08/22/1998    TDAP 08/22/1998, 06/16/2008, 08/13/2012, 11/11/2016    Tb Intradermal Test 05/09/2023       Health Maintenance   Topic Date Due    Annual Physical  Never done    Mammogram  Never done    Pap Smear  04/03/2023    COVID-19 Vaccine (3 - 2024-25 season) 09/01/2024    Influenza Vaccine (Season Ended) 10/01/2025    DTaP,Tdap,and Td Vaccines (11 - Td or Tdap) 11/11/2026    Annual Depression Screening  Completed    Pneumococcal Vaccine: Birth to 50yrs  Aged Out    Meningococcal B Vaccine  Aged Out        Current Medications[4]  Allergies:Allergies[5]   PHYSICAL EXAM:     Chief Complaint   Patient presents with    Routine Physical     Sees gyne       Physical Exam  Vitals and nursing note reviewed.   Constitutional:       Appearance: She is well-developed.   HENT:      Head: Normocephalic and atraumatic.      Right Ear: External ear normal.      Left Ear: External ear normal.      Nose: Nose normal.      Mouth/Throat:      Pharynx: No oropharyngeal exudate.   Eyes:      General:         Right eye: No discharge.         Left eye: No discharge.      Conjunctiva/sclera: Conjunctivae normal.      Pupils: Pupils are equal, round, and reactive to light.   Neck:      Thyroid: No thyromegaly.   Cardiovascular:      Rate and Rhythm: Normal rate and regular rhythm.      Heart sounds: Normal heart sounds. No murmur heard.  Pulmonary:      Effort: Pulmonary effort is normal.      Breath sounds: Normal breath sounds. No wheezing.   Abdominal:      General: Bowel sounds are normal.      Palpations: Abdomen is soft. There is no mass.      Tenderness: There is no abdominal tenderness.   Musculoskeletal:         General: No tenderness.      Cervical back: Normal range of motion and neck supple.    Lymphadenopathy:      Cervical: No cervical adenopathy.   Skin:     General: Skin is dry.      Findings: No rash.   Neurological:      Mental Status: She is alert and oriented to person, place, and time.      Cranial Nerves: No cranial nerve deficit.      Motor: No abnormal muscle tone.      Coordination: Coordination normal.      Deep Tendon Reflexes: Reflexes are normal and symmetric. Reflexes normal.   Psychiatric:         Behavior: Behavior normal.         Thought Content: Thought content normal.         Judgment: Judgment normal.                ASSESSMENT/PLAN:     Return yearly for physicals  Follow up with dentist every 6 months  Follow up with eye doctor yearly  Recommend aerobic exercise for at least 30mins 5 days a week  Yearly flu shot  Tetanus booster every 10 years (Tdap/ Td)  Labs ordered/ or reviewed if done prior to appointment     There are no diagnoses linked to this encounter.    Assessment & Plan     1. Well adult exam    - CBC With Differential With Platelet; Future  - Comp Metabolic Panel (14); Future  - Lipid Panel; Future  - TSH W Reflex To Free T4; Future  - Hemoglobin A1C; Future    2. Generalized anxiety disorder  Discussed anxiety and patient would like to resume medication   Start sertraline  - sertraline 25 MG Oral Tab; Take 1 tablet (25 mg total) by mouth daily.  Dispense: 90 tablet; Refill: 0    3. Encounter for screening mammogram for breast cancer    - [facility] LYNDSEY 2D+3D SCREENING BILAT (CPT=77067/74433); Future    4. Antiphospholipid antibody syndrome (HCC)    - Oncology/Hematology Referral - In Network    5. History of recurrent miscarriages    - Oncology/Hematology Referral - In Network    6. Overweight (BMI 25.0-29.9)    - [facility] - Ashleigh Green MD [facility]      Orders Placed This Encounter   Procedures    CBC With Differential With Platelet    Comp Metabolic Panel (14)    Lipid Panel    TSH W Reflex To Free T4    Hemoglobin A1C        The above note was creating using Dragon speech recognition technology. Please excuse any typos    Meds This Visit:  Requested Prescriptions     Signed Prescriptions Disp Refills    sertraline 25 MG Oral Tab 90 tablet 0     Sig: Take 1 tablet (25 mg total) by mouth daily.       Imaging & Referrals:  OP REFERRAL TO HEMATOLOGY/ONCOLOGY GROUP  BARIATRICS - INTERNAL  [facility] LYNDSEY 2D+3D SCREENING BILAT (CPT=77067/11080)       ID#[id number]       [1]   Past Medical History:   Antiphospholipid syndrome (HCC)    Decorative tattoo    Varicella    Had chicken pox during childhood   [2]   Past Surgical History:  Procedure Laterality Date    D & c      suction D&C for 12 wk fetal demise      (9 hr. labor);     Tonsillectomy      Aubrey teeth removed     [3]   Family History  Problem Relation Age of Onset    Thyroid disease Father         hypothyroidism    Thyroid disease Mother         hypothyroidism    Prostate Cancer Maternal Grandfather     Diabetes Maternal Uncle     Hypertension Maternal Uncle    [4]   Current Outpatient Medications   Medication Sig Dispense Refill    sertraline 25 MG Oral Tab Take 1 tablet (25 mg total) by mouth daily. 90 tablet 0   [5] No Known Allergies

## 2025-07-22 ENCOUNTER — HOSPITAL ENCOUNTER (OUTPATIENT)
Dept: MAMMOGRAPHY | Facility: HOSPITAL | Age: 40
Discharge: HOME | End: 2025-07-22
Attending: FAMILY MEDICINE
Payer: COMMERCIAL

## 2025-07-22 DIAGNOSIS — Z12.31 ENCOUNTER FOR SCREENING MAMMOGRAM FOR BREAST CANCER: ICD-10-CM

## 2025-07-22 PROCEDURE — 77067 SCR MAMMO BI INCL CAD: CPT | Performed by: FAMILY MEDICINE

## 2025-07-22 PROCEDURE — 77063 BREAST TOMOSYNTHESIS BI: CPT | Performed by: FAMILY MEDICINE

## (undated) DIAGNOSIS — Z20.822 SUSPECTED COVID-19 VIRUS INFECTION: Primary | ICD-10-CM

## (undated) DEVICE — CANISTER SAFETOUCH SYST DISP

## (undated) DEVICE — D AND C PACK: Brand: MEDLINE INDUSTRIES, INC.

## (undated) DEVICE — TUBING SUCTION COLLECTION SET

## (undated) DEVICE — GAMMEX® PI HYBRID SIZE 6, STERILE POWDER-FREE SURGICAL GLOVE, POLYISOPRENE AND NEOPRENE BLEND: Brand: GAMMEX

## (undated) DEVICE — SOL  .9 1000ML BTL

## (undated) DEVICE — CURRETTE 9MM CVD

## (undated) DEVICE — STERILE POLYISOPRENE POWDER-FREE SURGICAL GLOVES: Brand: PROTEXIS

## (undated) DEVICE — CURRETTE 10MM CVD

## (undated) DEVICE — GYN CDS: Brand: MEDLINE INDUSTRIES, INC.

## (undated) DEVICE — CURRETTE 8MM CVD

## (undated) DEVICE — CANISTER SCT BTL SL CAP BRKLY

## (undated) DEVICE — GAMMEX® PI HYBRID SIZE 6.5, STERILE POWDER-FREE SURGICAL GLOVE, POLYISOPRENE AND NEOPRENE BLEND: Brand: GAMMEX

## (undated) DEVICE — KENDALL SCD EXPRESS SLEEVES, KNEE LENGTH, MEDIUM: Brand: KENDALL SCD

## (undated) DEVICE — CURRETTE 11MM CVD

## (undated) NOTE — Clinical Note
1/24/2017              Marie Sung        [address] [place] [address]        [place] [id number]         To Whom It May Concern,    Please excuse our patient, Prema Quinteros, from working starting 1/23/17. Patient estimated due date is 2/1/17.

## (undated) NOTE — LETTER
[facility] [address], [address] [facility]    Consent for Anesthesia   1.    Jhonthai Lockhart agree to be cared for by a physician anesthesiologist alone and/or with a nurse anesthetist, who is specially trained to monitor me and give · Rare risks include: remembering what happened during my procedure, allergic reactions to medications, injury to my airway, heart, lungs, vision, nerves, or muscles and in extremely rare instances death.   5. My doctor has explained to me other choices colleen Patient Name: Georgia Veloz     : 1985                 Printed: 2021 at 10:04 AM    Medical Record #: [id number]                                            Page 1 of 1

## (undated) NOTE — Clinical Note
2017          RE:  James Salgado  :  1985      To Whom It May Concern:    James Salgado  was under our care for pregnancy and postpartum care. She is now cleared to go back to work without restrictions.      If you have any additional

## (undated) NOTE — LETTER
[facility]  Administration of Anesthesia  1. Maya Perry, or _________________________________ acting on her behalf, (Patient) (Dependent/Representative) request to receive anesthesia for my pending procedure/operation/treatment. 6. OBSTETRIC PATIENTS: Specific risks/consequences of spinal/epidural anesthesia may include itching, low blood pressure, difficulty urinating, slowing of the baby's heart rate and headache.  Rare risks include infections, high spinal block, spinal bleeding ___________________________________________________           _____________________________________________________  Date/Time                                                                                               Responsible person in case of minor

## (undated) NOTE — LETTER
4/7/2020              [address] [facility]        [facility] [address]         Dear Sandi North,      It was a pleasure to see you at our [address] [place] [address] office.  Your pap, and HPV was neg, rep

## (undated) NOTE — LETTER
Adriana Diez [address] [facility] [address], [address] [facility]  Authorization for Surgical Operation and Procedure   Date:___________                                                                                            Time:__________  1.  I hereby aut 4.   Should the need arise during my operation or immediate post-operative period, I also consent to the administration of blood and/or blood products.   Further, I understand that despite careful testing and screening of blood or blood products by rhys 8.   I recognize that in the event my procedure results in extended X-Ray/fluoroscopy time, I may develop a skin reaction. 9.  If I have a Do Not Attempt Resuscitation (DNAR) order in place, that status will be suspended while in the operating room, proc

## (undated) NOTE — LETTER
Adriana Diez 182  [address] [facility] [address], 209 [facility]  Authorization for Surgical Operation and Procedure     Date:___________                                                                                                         Time:__________ 4.   Should the need arise during my operation or immediate post-operative period, I also consent to the administration of blood and/or blood products.   Further, I understand that despite careful testing and screening of blood or blood products by rhys 8.   I recognize that in the event my procedure results in extended X-Ray/fluoroscopy time, I may develop a skin reaction. 9.  If I have a Do Not Attempt Resuscitation (DNAR) order in place, that status will be suspended while in the operating room, proc 1. IMynor agree to be cared for by an anesthesiologist, who is specially trained to monitor me and give me medicine to put me to sleep or keep me comfortable during my procedure.     I understand that my anesthesiologist is not an employee or 5. My doctor has explained to me other choices available to me for my care (alternatives).     6. Pregnant Patients (“epidural”):  I understand that the risks of having an epidural (medicine given into my back to help control pain during labor), include itc

## (undated) NOTE — LETTER
[address]      Authorization for Surgical Operation and Procedure     Date:02/05/2021_________                                                                                                         Time 4.   Should the need arise during my operation or immediate post-operative period, I also consent to the administration of blood and/or blood products.   Further, I understand that despite careful testing and screening of blood or blood products by rhys 8.   I recognize that in the event my procedure results in extended X-Ray/fluoroscopy time, I may develop a skin reaction. 9.  If I have a Do Not Attempt Resuscitation (DNAR) order in place, that status will be suspended while in the operating room, proc STATEMENT OF PHYSICIAN My signature below affirms that prior to the time of the procedure; I have explained to the patient and/or his/her legal representative, the risks and benefits involved in the proposed treatment and any reasonable alternative to the

## (undated) NOTE — LETTER
[address]      Authorization for Surgical Operation and Procedure     Date:2/5/21___________                                                                                                         Time:_ 4.   Should the need arise during my operation or immediate post-operative period, I also consent to the administration of blood and/or blood products.   Further, I understand that despite careful testing and screening of blood or blood products by rhys 8.   I recognize that in the event my procedure results in extended X-Ray/fluoroscopy time, I may develop a skin reaction. 9.  If I have a Do Not Attempt Resuscitation (DNAR) order in place, that status will be suspended while in the operating room, proc STATEMENT OF PHYSICIAN My signature below affirms that prior to the time of the procedure; I have explained to the patient and/or his/her legal representative, the risks and benefits involved in the proposed treatment and any reasonable alternative to the

## (undated) NOTE — LETTER
MINOR CASE LETTER      11/5/2021        Dear Elvin Dela Cruz,    Your are having a Suction Dilation and Curettage on Monday,11/08/2021 at 11:30am with [facility].    Do not eat or drink anything (including water) after midnight the ni Sincerely,          [place]  [address] [place] [address], [address]  [place] [address] [address]  [phone]    Document electronically generated by:  Malou Avina

## (undated) NOTE — MR AVS SNAPSHOT
[address]  [address]  [phone]               Thank you for choosing us for your health care visit with Nurse.   We are glad to serve you and happy to provide you with this summary of your vis UROBILINOGEN,SEMI-QN NEG 0.0 - 1.9 mg/dL    NITRITE, URINE NEG Negative    LEUKOCYTES TR Negative    APPEARANCE CLEAR Clear    URINE-COLOR YELLOW Yellow    Mult[employer] Lot# [id number] Numeric    Multistix Expiration Date 9/2017 Date    09/2017

## (undated) NOTE — IP AVS SNAPSHOT
[address] [facility] [address]  [address] [facility] [phone]                Discharge Summary   2/7/2017    Shelly Deepa           Admission Information        Provider Department    2/7/2017 Alejandro Gracia MD [facility] Why:  As needed    Contact information:    [place] [id number]  [phone]        Follow up with Lianna Wilson MD. Call in 6 weeks.     Specialty:  OBSTETRICS & GYNECOLOGY    Why:  for postpartum check    Contact informatio 7.4 (01/23/17)  1.2 (01/23/17)  0.7 (01/23/17)  0.1 (01/23/17)  0.0      Metabolic Lab Results  (Last result in the past 90 days)    HgbA1C Glucose BUN Creatinine Calcium Alkaline Phosph AST    -- -- -- -- -- -- (02/06/17)  17      Metabolic Lab Results  ( Medicaid plans. To get signed up and covered, please call [phone] and ask to get set up for an insurance coverage that is in-network with Steven Rivas.         MyChart     Visit [employer]â„¢  You can access your MyChart to more actively manage